# Patient Record
Sex: MALE | Race: WHITE | URBAN - METROPOLITAN AREA
[De-identification: names, ages, dates, MRNs, and addresses within clinical notes are randomized per-mention and may not be internally consistent; named-entity substitution may affect disease eponyms.]

---

## 2017-08-01 ENCOUNTER — HISTORICAL (OUTPATIENT)
Dept: ADMINISTRATIVE | Facility: HOSPITAL | Age: 82
End: 2017-08-01

## 2017-11-06 ENCOUNTER — HISTORICAL (OUTPATIENT)
Dept: ADMINISTRATIVE | Facility: HOSPITAL | Age: 82
End: 2017-11-06

## 2018-03-14 ENCOUNTER — HISTORICAL (OUTPATIENT)
Dept: ADMINISTRATIVE | Facility: HOSPITAL | Age: 83
End: 2018-03-14

## 2018-11-28 ENCOUNTER — HISTORICAL (OUTPATIENT)
Dept: ADMINISTRATIVE | Facility: HOSPITAL | Age: 83
End: 2018-11-28

## 2019-06-21 ENCOUNTER — HISTORICAL (OUTPATIENT)
Dept: ADMINISTRATIVE | Facility: HOSPITAL | Age: 84
End: 2019-06-21

## 2019-10-19 ENCOUNTER — HISTORICAL (OUTPATIENT)
Dept: ADMINISTRATIVE | Facility: HOSPITAL | Age: 84
End: 2019-10-19

## 2019-10-19 LAB
APPEARANCE, UA: CLEAR
BILIRUB UR QL STRIP: NEGATIVE
COLOR UR: YELLOW
GLUCOSE (UA): ABNORMAL
HGB UR QL STRIP: NEGATIVE
KETONES UR QL STRIP: NEGATIVE
LEUKOCYTE ESTERASE UR QL STRIP: NEGATIVE
NITRITE UR QL STRIP: NEGATIVE
PH UR STRIP: 6 [PH] (ref 5–7)
PROT UR QL STRIP: NEGATIVE
SP GR UR STRIP: 1.01 (ref 1–1.03)
UROBILINOGEN UR STRIP-ACNC: NEGATIVE

## 2019-11-14 ENCOUNTER — HISTORICAL (OUTPATIENT)
Dept: ADMINISTRATIVE | Facility: HOSPITAL | Age: 84
End: 2019-11-14

## 2019-11-14 LAB
APPEARANCE, UA: CLEAR
BACTERIA SPEC CULT: ABNORMAL
BILIRUB UR QL STRIP: NEGATIVE
COLOR UR: YELLOW
GLUCOSE (UA): ABNORMAL
HGB UR QL STRIP: ABNORMAL
HYALINE CASTS #/AREA URNS LPF: ABNORMAL /LPF
KETONES UR QL STRIP: NEGATIVE
LEUKOCYTE ESTERASE UR QL STRIP: NEGATIVE
NITRITE UR QL STRIP: NEGATIVE
PH UR STRIP: 6 [PH] (ref 5–7)
PROT UR QL STRIP: ABNORMAL
RBC #/AREA URNS HPF: ABNORMAL /HPF
SP GR UR STRIP: 1.02 (ref 1–1.03)
SQUAMOUS EPITHELIAL, UA: ABNORMAL /LPF
UROBILINOGEN UR STRIP-ACNC: NEGATIVE
WBC #/AREA URNS HPF: ABNORMAL /HPF

## 2019-11-22 ENCOUNTER — HISTORICAL (OUTPATIENT)
Dept: ADMINISTRATIVE | Facility: HOSPITAL | Age: 84
End: 2019-11-22

## 2019-11-22 LAB
ABS NEUT (OLG): 6.73 X10(3)/MCL (ref 2.1–9.2)
ALBUMIN SERPL-MCNC: 2.5 GM/DL (ref 3.4–5)
ALBUMIN/GLOB SERPL: 0.7 {RATIO}
ALP SERPL-CCNC: 75 UNIT/L (ref 50–136)
ALT SERPL-CCNC: 10 UNIT/L (ref 12–78)
APPEARANCE, UA: CLEAR
AST SERPL-CCNC: 27 UNIT/L (ref 15–37)
BACTERIA SPEC CULT: ABNORMAL /HPF
BASOPHILS # BLD AUTO: 0 X10(3)/MCL (ref 0–0.2)
BASOPHILS NFR BLD AUTO: 0 %
BILIRUB SERPL-MCNC: 0.7 MG/DL (ref 0.2–1)
BILIRUB UR QL STRIP: NEGATIVE
BILIRUBIN DIRECT+TOT PNL SERPL-MCNC: 0.2 MG/DL (ref 0–0.2)
BILIRUBIN DIRECT+TOT PNL SERPL-MCNC: 0.5 MG/DL (ref 0–0.8)
BUN SERPL-MCNC: 45 MG/DL (ref 7–18)
CALCIUM SERPL-MCNC: 8.9 MG/DL (ref 8.5–10.1)
CHLORIDE SERPL-SCNC: 105 MMOL/L (ref 98–107)
CO2 SERPL-SCNC: 26 MMOL/L (ref 21–32)
COLOR UR: YELLOW
CREAT SERPL-MCNC: 1.47 MG/DL (ref 0.7–1.3)
EOSINOPHIL # BLD AUTO: 0.1 X10(3)/MCL (ref 0–0.9)
EOSINOPHIL NFR BLD AUTO: 1 %
ERYTHROCYTE [DISTWIDTH] IN BLOOD BY AUTOMATED COUNT: 12.2 % (ref 11.5–17)
GLOBULIN SER-MCNC: 3.7 GM/DL (ref 2.4–3.5)
GLUCOSE (UA): ABNORMAL
GLUCOSE SERPL-MCNC: 287 MG/DL (ref 74–106)
HCT VFR BLD AUTO: 33.5 % (ref 42–52)
HGB BLD-MCNC: 11 GM/DL (ref 14–18)
HGB UR QL STRIP: NEGATIVE
KETONES UR QL STRIP: ABNORMAL
LEUKOCYTE ESTERASE UR QL STRIP: ABNORMAL
LYMPHOCYTES # BLD AUTO: 1 X10(3)/MCL (ref 0.6–4.6)
LYMPHOCYTES NFR BLD AUTO: 12 %
MCH RBC QN AUTO: 33 PG (ref 27–31)
MCHC RBC AUTO-ENTMCNC: 32.8 GM/DL (ref 33–36)
MCV RBC AUTO: 100.6 FL (ref 80–94)
MONOCYTES # BLD AUTO: 0.7 X10(3)/MCL (ref 0.1–1.3)
MONOCYTES NFR BLD AUTO: 8 %
NEUTROPHILS # BLD AUTO: 6.73 X10(3)/MCL (ref 2.1–9.2)
NEUTROPHILS NFR BLD AUTO: 78 %
NITRITE UR QL STRIP: NEGATIVE
PH UR STRIP: 5.5 [PH] (ref 5–9)
PLATELET # BLD AUTO: 119 X10(3)/MCL (ref 130–400)
PMV BLD AUTO: 10.5 FL (ref 9.4–12.4)
POTASSIUM SERPL-SCNC: 4.2 MMOL/L (ref 3.5–5.1)
PROT SERPL-MCNC: 6.2 GM/DL (ref 6.4–8.2)
PROT UR QL STRIP: ABNORMAL
RBC # BLD AUTO: 3.33 X10(6)/MCL (ref 4.7–6.1)
RBC #/AREA URNS HPF: ABNORMAL /[HPF]
SODIUM SERPL-SCNC: 139 MMOL/L (ref 136–145)
SP GR UR STRIP: 1.02 (ref 1–1.03)
SQUAMOUS EPITHELIAL, UA: ABNORMAL
UROBILINOGEN UR STRIP-ACNC: 0.2
WBC # SPEC AUTO: 8.6 X10(3)/MCL (ref 4.5–11.5)
WBC #/AREA URNS HPF: 18 /HPF (ref 0–3)

## 2019-11-23 LAB
ABS NEUT (OLG): 6.53 X10(3)/MCL (ref 2.1–9.2)
ABS NEUT (OLG): 6.73 X10(3)/MCL (ref 2.1–9.2)
ALBUMIN SERPL-MCNC: 2.6 GM/DL (ref 3.4–5)
ALBUMIN/GLOB SERPL: 0.7 RATIO (ref 1.1–2)
ALP SERPL-CCNC: 74 UNIT/L (ref 50–136)
ALT SERPL-CCNC: 17 UNIT/L (ref 12–78)
AST SERPL-CCNC: 19 UNIT/L (ref 15–37)
BASOPHILS # BLD AUTO: 0 X10(3)/MCL (ref 0–0.2)
BASOPHILS # BLD AUTO: 0 X10(3)/MCL (ref 0–0.2)
BASOPHILS NFR BLD AUTO: 0 %
BASOPHILS NFR BLD AUTO: 0 %
BILIRUB SERPL-MCNC: 0.8 MG/DL (ref 0.2–1)
BILIRUBIN DIRECT+TOT PNL SERPL-MCNC: 0.2 MG/DL (ref 0–0.5)
BILIRUBIN DIRECT+TOT PNL SERPL-MCNC: 0.6 MG/DL (ref 0–0.8)
BUN SERPL-MCNC: 36 MG/DL (ref 7–18)
CALCIUM SERPL-MCNC: 8.7 MG/DL (ref 8.5–10.1)
CHLORIDE SERPL-SCNC: 108 MMOL/L (ref 98–107)
CO2 SERPL-SCNC: 28 MMOL/L (ref 21–32)
CREAT SERPL-MCNC: 1.23 MG/DL (ref 0.7–1.3)
EOSINOPHIL # BLD AUTO: 0.1 X10(3)/MCL (ref 0–0.9)
EOSINOPHIL # BLD AUTO: 0.1 X10(3)/MCL (ref 0–0.9)
EOSINOPHIL NFR BLD AUTO: 1 %
EOSINOPHIL NFR BLD AUTO: 2 %
ERYTHROCYTE [DISTWIDTH] IN BLOOD BY AUTOMATED COUNT: 12.1 % (ref 11.5–17)
ERYTHROCYTE [DISTWIDTH] IN BLOOD BY AUTOMATED COUNT: 12.2 % (ref 11.5–17)
GLOBULIN SER-MCNC: 3.5 GM/DL (ref 2.4–3.5)
GLUCOSE SERPL-MCNC: 214 MG/DL (ref 74–106)
HCT VFR BLD AUTO: 35.8 % (ref 42–52)
HCT VFR BLD AUTO: 36 % (ref 42–52)
HGB BLD-MCNC: 11.8 GM/DL (ref 14–18)
HGB BLD-MCNC: 11.8 GM/DL (ref 14–18)
LYMPHOCYTES # BLD AUTO: 1 X10(3)/MCL (ref 0.6–4.6)
LYMPHOCYTES # BLD AUTO: 1 X10(3)/MCL (ref 0.6–4.6)
LYMPHOCYTES NFR BLD AUTO: 12 %
LYMPHOCYTES NFR BLD AUTO: 12 %
MCH RBC QN AUTO: 32.9 PG (ref 27–31)
MCH RBC QN AUTO: 33 PG (ref 27–31)
MCHC RBC AUTO-ENTMCNC: 32.8 GM/DL (ref 33–36)
MCHC RBC AUTO-ENTMCNC: 33 GM/DL (ref 33–36)
MCV RBC AUTO: 100 FL (ref 80–94)
MCV RBC AUTO: 100.3 FL (ref 80–94)
MONOCYTES # BLD AUTO: 0.5 X10(3)/MCL (ref 0.1–1.3)
MONOCYTES # BLD AUTO: 0.5 X10(3)/MCL (ref 0.1–1.3)
MONOCYTES NFR BLD AUTO: 6 %
MONOCYTES NFR BLD AUTO: 6 %
NEUTROPHILS # BLD AUTO: 6.53 X10(3)/MCL (ref 2.1–9.2)
NEUTROPHILS # BLD AUTO: 6.73 X10(3)/MCL (ref 2.1–9.2)
NEUTROPHILS NFR BLD AUTO: 79 %
NEUTROPHILS NFR BLD AUTO: 80 %
PLATELET # BLD AUTO: 130 X10(3)/MCL (ref 130–400)
PLATELET # BLD AUTO: 132 X10(3)/MCL (ref 130–400)
PMV BLD AUTO: 10.1 FL (ref 9.4–12.4)
PMV BLD AUTO: 10.1 FL (ref 9.4–12.4)
POTASSIUM SERPL-SCNC: 4.4 MMOL/L (ref 3.5–5.1)
PROT SERPL-MCNC: 6.1 GM/DL (ref 6.4–8.2)
RBC # BLD AUTO: 3.58 X10(6)/MCL (ref 4.7–6.1)
RBC # BLD AUTO: 3.59 X10(6)/MCL (ref 4.7–6.1)
SODIUM SERPL-SCNC: 144 MMOL/L (ref 136–145)
WBC # SPEC AUTO: 8.3 X10(3)/MCL (ref 4.5–11.5)
WBC # SPEC AUTO: 8.4 X10(3)/MCL (ref 4.5–11.5)

## 2019-11-24 LAB
ABS NEUT (OLG): 6.81 X10(3)/MCL (ref 2.1–9.2)
BASOPHILS # BLD AUTO: 0 X10(3)/MCL (ref 0–0.2)
BASOPHILS NFR BLD AUTO: 0 %
BUN SERPL-MCNC: 29 MG/DL (ref 7–18)
CALCIUM SERPL-MCNC: 8.1 MG/DL (ref 8.5–10.1)
CHLORIDE SERPL-SCNC: 108 MMOL/L (ref 98–107)
CO2 SERPL-SCNC: 28 MMOL/L (ref 21–32)
CREAT SERPL-MCNC: 1.05 MG/DL (ref 0.7–1.3)
CREAT/UREA NIT SERPL: 27.6
EOSINOPHIL # BLD AUTO: 0.2 X10(3)/MCL (ref 0–0.9)
EOSINOPHIL NFR BLD AUTO: 2 %
ERYTHROCYTE [DISTWIDTH] IN BLOOD BY AUTOMATED COUNT: 12.1 % (ref 11.5–17)
GLUCOSE SERPL-MCNC: 166 MG/DL (ref 74–106)
HCT VFR BLD AUTO: 32.3 % (ref 42–52)
HGB BLD-MCNC: 10.6 GM/DL (ref 14–18)
LYMPHOCYTES # BLD AUTO: 1.2 X10(3)/MCL (ref 0.6–4.6)
LYMPHOCYTES NFR BLD AUTO: 13 %
MCH RBC QN AUTO: 32.8 PG (ref 27–31)
MCHC RBC AUTO-ENTMCNC: 32.8 GM/DL (ref 33–36)
MCV RBC AUTO: 100 FL (ref 80–94)
MONOCYTES # BLD AUTO: 0.5 X10(3)/MCL (ref 0.1–1.3)
MONOCYTES NFR BLD AUTO: 6 %
NEUTROPHILS # BLD AUTO: 6.81 X10(3)/MCL (ref 2.1–9.2)
NEUTROPHILS NFR BLD AUTO: 77 %
PLATELET # BLD AUTO: 150 X10(3)/MCL (ref 130–400)
PMV BLD AUTO: 10 FL (ref 9.4–12.4)
POTASSIUM SERPL-SCNC: 4 MMOL/L (ref 3.5–5.1)
RBC # BLD AUTO: 3.23 X10(6)/MCL (ref 4.7–6.1)
SODIUM SERPL-SCNC: 141 MMOL/L (ref 136–145)
WBC # SPEC AUTO: 8.8 X10(3)/MCL (ref 4.5–11.5)

## 2019-11-25 LAB
ABS NEUT (OLG): 5.75 X10(3)/MCL (ref 2.1–9.2)
BASOPHILS # BLD AUTO: 0 X10(3)/MCL (ref 0–0.2)
BASOPHILS NFR BLD AUTO: 0 %
BUN SERPL-MCNC: 27 MG/DL (ref 7–18)
CALCIUM SERPL-MCNC: 7.9 MG/DL (ref 8.5–10.1)
CHLORIDE SERPL-SCNC: 111 MMOL/L (ref 98–107)
CO2 SERPL-SCNC: 26 MMOL/L (ref 21–32)
CREAT SERPL-MCNC: 1.05 MG/DL (ref 0.7–1.3)
CREAT/UREA NIT SERPL: 25.7
EOSINOPHIL # BLD AUTO: 0.2 X10(3)/MCL (ref 0–0.9)
EOSINOPHIL NFR BLD AUTO: 2 %
ERYTHROCYTE [DISTWIDTH] IN BLOOD BY AUTOMATED COUNT: 12.3 % (ref 11.5–17)
GLUCOSE SERPL-MCNC: 181 MG/DL (ref 74–106)
HCT VFR BLD AUTO: 29.9 % (ref 42–52)
HGB BLD-MCNC: 9.7 GM/DL (ref 14–18)
LYMPHOCYTES # BLD AUTO: 1 X10(3)/MCL (ref 0.6–4.6)
LYMPHOCYTES NFR BLD AUTO: 13 %
MCH RBC QN AUTO: 32.7 PG (ref 27–31)
MCHC RBC AUTO-ENTMCNC: 32.4 GM/DL (ref 33–36)
MCV RBC AUTO: 100.7 FL (ref 80–94)
MONOCYTES # BLD AUTO: 0.4 X10(3)/MCL (ref 0.1–1.3)
MONOCYTES NFR BLD AUTO: 6 %
NEUTROPHILS # BLD AUTO: 5.75 X10(3)/MCL (ref 2.1–9.2)
NEUTROPHILS NFR BLD AUTO: 77 %
PLATELET # BLD AUTO: 152 X10(3)/MCL (ref 130–400)
PMV BLD AUTO: 10 FL (ref 9.4–12.4)
POTASSIUM SERPL-SCNC: 4.2 MMOL/L (ref 3.5–5.1)
RBC # BLD AUTO: 2.97 X10(6)/MCL (ref 4.7–6.1)
SODIUM SERPL-SCNC: 143 MMOL/L (ref 136–145)
WBC # SPEC AUTO: 7.5 X10(3)/MCL (ref 4.5–11.5)

## 2022-09-13 NOTE — ED PROVIDER NOTES
Patient:   Max Oemr            MRN: [id number]            FIN: [id number]               Age:   85 years     Sex:  Male     :  10/21/1933   Associated Diagnoses:   Low back strain   Author:   Andres BELL MD, Danny URIOSTEGUI      Basic Information   Time seen: Date & time 2019 11:34:00.   History source: Patient, EMS.   Arrival mode: Ambulance.   History limitation: None.   Additional information: Chief Complaint from Nursing Triage Note : Chief Complaint   2019 11:35 CDT      Chief Complaint           level 2 trauma, fall  .      History of Present Illness   The patient presents following 84 y/o CM with hx of DM, CVA and Parkinsons presents to the ED via EMS as a level 2 trauma sent from [facility] being pt was attempting to get out of bed when he fell and hit his posterior head PTA. EMS reports there was blood on the floor upon their arrival. Pt's GCS was initially 15, then dropped to 14 d/t confusion as pt was unable to tell them who the president was. .  The onset was just prior to arrival.  The occurrence was single episode.  The fall was described as tripped.  The location where the incident occurred was at a nursing home ([facility]).  Location: Posterior head. The character of symptoms is pain.  The degree at present is moderate.  The exacerbating factor is none.  The relieving factor is none.  Risk factors consist of age and CVA, DM.  Therapy today: emergency medical services.  Preceding symptoms none.        Review of Systems   Constitutional symptoms:  No fever, no chills, no sweats, no weakness.    Skin symptoms:  No rash,    Eye symptoms:  No recent vision problems,    ENMT symptoms:  No ear pain,    Respiratory symptoms:  No shortness of breath, no orthopnea.    Cardiovascular symptoms:  No chest pain, no palpitations.    Gastrointestinal symptoms:  No abdominal pain, no nausea, no vomiting, no diarrhea.    Genitourinary symptoms:  No dysuria, no hematuria.     Musculoskeletal symptoms:  No back pain, no Muscle pain.    Psychiatric symptoms:  No anxiety, no depression.    Allergy/immunologic symptoms:  No seasonal allergies, no food allergies.              Additional review of systems information: All other systems reviewed and otherwise negative, All systems reviewed as documented in chart.      Health Status   Allergies: No known allergies.   Medications:  (Selected)   Inpatient Medications  Ordered  Sodium Chloride 0.9% intravenous solution 1,000 mL: 1,000 mL, 1,000 mL, IV, Bolus, start date 06/21/19 11:41:00 CDT  Prescriptions  Prescribed  Sinemet CR 50 mg-200 mg oral tablet, extended release: 2 tab(s), Oral, QID, # 90 tab(s), 3 Refill(s), Pharmacy: [facility]  albuterol CFC free 90 mcg/inh inhalation aerosol with adapter: 2 puff(s), INH, q4hr, PRN PRN for wheezing, # 1 EA, 0 Refill(s)  sertraline 50 mg oral tablet: 50 mg = 1 tab(s), Oral, Daily, # 30 tab(s), 11 Refill(s), other reason (Rx)  Documented Medications  Documented  ADVAIR HFA   /21: 2 puff(s), INH, BID  ALBUTEROL SULFATE .083 % NEBU: 2.5 mg = 3 mL, NEB, q8hr  Bacmin oral tablet: 1 tab(s), Oral, Daily, # 90 tab(s), 0 Refill(s)  Flovent  mcg/inh inhalation aerosol: 2 puff(s), INH, BID, 0 Refill(s)  JANUVIA 100 MG TABS: 100 mg = 1 tab(s), Oral, Daily  Lantus 100 units/mL subcutaneous inj.: 5 units, Subcutaneous, Once a day (at bedtime), # 10 mL, 0 Refill(s)  SERTRALINE HCL 50 MG TABS: 50 mg = 1 tab(s), Oral, Daily  Tylenol Extra Strength 500 mg oral tablet: 500 mg = 1 tab(s), Oral, q4hr, PRN PRN as needed for pain, # 24 tab(s), 0 Refill(s)  aspirin 81 mg oral tablet: 81 mg = 1 tab(s), Oral, Daily, # 30 tab(s), 0 Refill(s)  carvedilol 6.25 mg oral tablet: 6.25 mg = 1 tab(s), Oral, BID, # 60 tab(s), 0 Refill(s)  glimepiride 4 mg oral tablet: 4 mg = 1 tab(s), Oral, BID, # 90 tab(s), 0 Refill(s)  isosorbide DInitrate 20 mg oral tablet: 20 mg = 1 tab(s), Oral, BID, # 120 tab(s), 0  Refill(s)  lisinopril 10 mg oral tablet: 10 mg = 1 tab(s), Oral, Daily, # 90 tab(s), 0 Refill(s)  metFORMIN 1000 mg oral tablet: 1000 mg = 1 tab(s), Oral, BID  metformin 500 mg oral tablet: 500 mg = 1 tab(s), Oral, BID, # 180 tab(s), 0 Refill(s)  pravastatin 40 mg oral tablet: 40 mg = 1 tab(s), Oral, Daily, # 90 tab(s), 0 Refill(s)  tamsulosin 0.4 mg oral capsule: 0.4 mg = 1 cap(s), Oral, Daily, # 90 cap(s), 0 Refill(s).      Past Medical/ Family/ Social History   Medical history:    Resolved  Prostate cancer ([id number]):  Resolved.  Parkinson disease ([id number]):  Resolved..   Surgical history:    Back fusion ([id number]) in 1988 at 55 Years.  Cataract surgery ([id number]).  Heart valve repair ([id number])..   Family history:    Heart failure.  Mother  Lung cancer.  Father  Brother  Sister  .   Social history: Alcohol use: Denies, Tobacco use: Denies, Drug use: Denies, Occupation: Retired, Family/social situation: , nursing home resident ([facility]).      Physical Examination               Vital Signs      No qualifying data available.               Per nurse's notes.   Oxygen saturation.   General:  Alert, no acute distress, elderly male .    Panther coma scale:  Eye response: 4 /4, verbal response: 5 /5, motor response: 6 /6, Total score: Total score: 15.    Neurological:  Alert and oriented to person, place, time, and situation, No focal neurological deficit observed, CN II-XII intact, normal sensory observed, normal motor observed, normal speech observed.    Skin:  Warm, pink, intact, moist, no rash.    Head:  Normocephalic, left occiput hematoma with laceration and surrounding erythema, unable to completely visualize d/t cervical collar.    Neck:  Cervical collar in place by EMS. Replaced collar at 1139..   Cardiovascular:  Regular rate and rhythm, No murmur, Normal peripheral perfusion, No edema.    Respiratory:  Lungs are clear to auscultation,  respirations are non-labored, breath sounds are equal, Symmetrical chest wall expansion.    Gastrointestinal:  Soft, Non distended, Normal bowel sounds.    Musculoskeletal:  Normal ROM, normal strength.    Lymphatics:  No lymphadenopathy.   Psychiatric:  Cooperative, appropriate mood & affect, normal judgment.       Medical Decision Making   Documents reviewed:  Emergency department nurses' notes.   Orders  Laboratory    POC ISTAT Chem8 Request:, Danny Campos III, MD, 06/21/19, 11:41, Completed    CBC w/ Auto Diff, Danny Campos III, MD, 06/21/19, 11:41, Completed    EtOH Level, Danny Campos III, MD, 06/21/19, 11:41, Completed    Automated Diff, Danny Campos III, MD, 06/21/19, 11:43, Completed    Point of Care iSTAT Chem8, ER, Physician, 06/21/19, 12:06, Completed    CMP, Danny Campos III, MD, 06/21/19, 11:41, Ordered    Urinalysis Complete a reflex to culture, Danny Campos III, MD, 06/21/19, 11:41, Ordered    Urine Drug Screen, Danny Campos III, MD, 06/21/19, 11:41, Ordered    Amylase Level, Danny Campos III, MD, 06/21/19, 11:41, Ordered    Lipase Level, Danny Campos III, MD, 06/21/19, 11:41, Ordered    Lactic Acid, Danny Campos III, MD, 06/21/19, 11:41, Ordered    PT, Danny Campos III, MD, 06/21/19, 11:41, Ordered    PTT, Danny Campos III, MD, 06/21/19, 11:41, Ordered    Type and Crossmatch 1st order, Danny Campos III, MD, 06/21/19, 11:41, Ordered    Type and Rh, Danny Campos III, MD, 06/21/19, 11:41, Ordered    Antibody Screen for transfusion  (Indirect Judy), Danny Campos III, MD, 06/21/19, 11:41, Ordered    Xmatch, Danny Campos III, MD, 06/21/19, 11:41, Ordered    Red Blood Cells, Danny Campos III, MD, 06/21/19, 11:41, Ordered  Xray    XR Chest 1 View, Danny Campos III, MD, 06/21/19, 11:41, Ordered    XR Pelvis 1 or 2 Views, Danny Campos III, MD, 06/21/19, 11:41, Ordered  EKG / Respiratory / Ancillary    O2 Therapy,  ER, Physician, 06/21/19, 12:07, Ordered  CT / MRI / Ultrasound    CT Head W/O Contrast, Danny Campos III, MD, 06/21/19, 11:41, Ordered    CT Cervical Spine W/O Contrast, Danny Campos III, MD, 06/21/19, 11:41, Ordered.   Results review:  Lab results : Lab View   6/21/2019 11:53 CDT      POC Sodium                137 mmol/L  LOW                             POC Potassium             4.7 mmol/L                             POC Chloride              103 mmol/L                             POC Ion Calcium           1.18 mmol/L                             POC Glucose               146 mg/dL  HI                             POC BUN                   32.0 mg/dL  HI                             POC Creatinine            1.0 mg/dL                             POC AGAP                  17.0  NA                             POC Hb                    12.6 mg/dL                             POC Hct                   37.0 %  LOW                             POC TCO2                  22.0 mmol/L  LOW    6/21/2019 11:43 CDT      Sodium Lvl                138 mmol/L                             Potassium Lvl             4.8 mmol/L                             Chloride                  108 mmol/L  HI                             CO2                       28.0 mmol/L                             Calcium Lvl               9.0 mg/dL                             Glucose Lvl               151 mg/dL  HI                             BUN                       34.0 mg/dL  HI                             Amylase Lvl               62 unit/L                             Albumin Lvl               3.70 gm/dL                             Lipase Lvl                113 unit/L                             WBC                       8.1 x10(3)/mcL                             RBC                       3.63 x10(6)/mcL  LOW                             Hgb                       12.1 gm/dL  LOW                             Hct                       35.5 %  LOW                              Platelet                  140 x10(3)/mcL                             MCV                       97.8 fL  HI                             MCH                       33.3 pg  HI                             MCHC                      34.1 gm/dL                             RDW                       12.6 %                             MPV                       9.8 fL                             Abs Neut                  6.37 x10(3)/mcL                             Neutro Auto               79 %  NA                             Lymph Auto                11 %  NA                             Mono Auto                 7 %  NA                             Eos Auto                  2 %  NA                             Abs Eos                   0.1 x10(3)/mcL                             Basophil Auto             0 %  NA                             Abs Neutro                6.37 x10(3)/mcL                             Abs Lymph                 0.9 x10(3)/mcL                             Abs Mono                  0.6 x10(3)/mcL                             Abs Baso                  0.0 x10(3)/mcL                             Ethanol Lvl               <3.0 mg/dL  .      Reexamination/ Reevaluation   Time: 6/21/2019 14:55:00 .   Interventions: Aspirin only maintain normal neurologic exam laceration) no persistent low back pain x-ray does not reveal any acute abnormalities compared to CT done in November but will repeat CT back for acute injury.      Procedure   Laceration repair   Time: 6/21/2019 14:56:00 .    Confirmed: Patient, procedure, side, and site correct.    Consent: Patient.       Description/ repair   Laceration  2 cm in length.Shape: linear.   Depth: superficial.   Details: clean.   Neurovascular/ tendon exam: intact.   Anesthesia: 1% lidocaine.   Preparation: sterile field established.   Irrigation: minimal.   Debridement: none.   Skin closure: # 2 sutures, simple technique, interrupted technique.   Complexity:  single layer.   Post procedure exam: Circulation, motor, sensory examination intact.    Complications: None.    Patient tolerated: Well.    Performed by: Self.    Total time: 5 minutes.       Impression and Plan   Diagnosis   Low back strain (JXH60-AD S39.012A)   Plan   Condition: Improved, Stable.    Disposition: Medically cleared.    Follow up with: Primary Care Physician.    Counseled: Patient, Regarding diagnosis, Regarding diagnostic results, Regarding treatment plan, Regarding prescription, Patient understood , Patient indicated understanding of instructions.    Notes: Ping LE, acted solely as a scribe for and in the presence of Dr. Campos who performed the service..

## 2022-09-13 NOTE — DISCHARGE SUMMARY
Patient:   Max Omer             MRN: [id number]            FIN: [id number]               Age:   84 years     Sex:  Male     :  10/21/1933   Associated Diagnoses:   None   Author:   Karl Haile MD      Discharge Information      Discharge Summary Information   ADMIT/DISCHARGE DATE   Admit Date: 2017  Discharge Date: 2017     Physicians   Attending Physician - Pako BEDOLLA, Tino DIANA  Attending Physician - Mic BEDOLLA, Karl    Consulting Physician - Chaz BEDOLLA, Christian BARBOSA    Primary Care Physician - Isabel BEDOLLA , Arnie Chappell     Discharge Diagnosis   L1 and L2 Compression Fracture s/p Fall    Chronic appearing T12 and L4 fractures per CT report      BLE weakness/Falls    Parkinsons Disease     HTN, benign    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   Immunizations   No immunizations recorded for this visit.     Discharge Medications   Continue  albuterol (albuterol CFC free 90 mcg/inh inhalation aerosol with adapter) 2 puff(s), INH, q4hr, PRN for wheezing  aspirin (aspirin 81 mg oral tablet) 81 mg, Oral, Daily  carbidopa-levodopa (Sinemet CR 50 mg-200 mg oral tablet, extended release) 2 tab(s), Oral, QID  carvedilol (carvedilol 6.25 mg oral tablet) 6.25 mg, Oral, BID  glimepiride (glimepiride 4 mg oral tablet) 4 mg, Oral, BID  isosorbide dinitrate (isosorbide DInitrate 20 mg oral tablet) 20 mg, Oral, BID  lisinopril (lisinopril 10 mg oral tablet) 10 mg, Oral, Daily  metFORMIN (metformin 500 mg oral tablet) 500 mg, Oral, BID  multivitamin with minerals (Bacmin oral tablet) 1 tab(s), Oral, Daily  pravastatin (pravastatin 40 mg oral tablet) 40 mg, Oral, Daily  sertraline (Zoloft 100 mg oral tablet) 100 mg, Oral, Daily  sitaGLIPtin (JANUVIA 100 MG TABS) 100 mg, Oral, Daily  tamsulosin (tamsulosin 0.4 mg oral capsule) 0.4  "mg, Oral, Daily  Discontinue  Misc Prescription (Rolling walker with seat) See Instructions  Misc Prescription (Seated bike pedal device) See Instructions  cefdinir (CEFDINIR 300 MG CAPS) 300 mg, Oral, BID  furosemide (Lasix 40 mg oral tablet) 40 mg, Oral, BID  potassium chloride (potassium chloride 20 mEq oral TABLET extended release) 20 mEq, Oral, Daily        Education   Discharge - Home, Give all scheduled vaccinations prior to discharge.   Discharge Activity - Activity as Tolerated   Discharge Diet - Cardiac         Followup   ATTENTION: Discharging Nurse:  If patient is discharged home, we need to notify his  agency: [facility] @ [phone] & alert them that their patient is being discharged home.  F/U with PCP when discharged from rehab  Ced Hubbard MD   Call for followup appointment in 2-3 weeks with 3 view LS spine xrays just prior to appt        Hospital Course   Hospital Course    85 yo CM with pmhx of  Parkinsons Disease, HTN, VHD s/p porcine valve, CAD, DMII, CVA, HLD and Prostate Cancer (remote) who presents to the ED with c/o increasing  weakness and  falls for the past week. Pt fell early last week, was seen by Dr. Ware on Friday (11/3) and Sinimet was increased.  Pt fell again on Saturday (11/4) and Sunday (11/5). Pt states "I fall because my legs shake and get weak". Pt fell onto his bed on Saturday while trying to get dressed. there was no LOC or head injury. The second fall was while he was walking, pt states he fell onto the side of the door. He did hit his head on the door but there was no LOC.  Since then pt has been experiencing some lower back pain so he was brought in to the ED for further evaluation. Pt denies unilateral weakness, facial droop, near syncope, dizziness, palpitations, CP, abd  pain, nausea, vomiting, diarrhea, urinary complaints, or bowel/bladder incontinence. Pt is able to ambulate but is very difficult per family. At baseline, he ambulates with walker and in able " to preform most ADLs independently. He is on lasix daily, he denies hx of CHF, states my legs swell when i sit down so I take a fluid pill.         Initial ED VS: Temp 36.5, HR 90, R 18, /74, 02 97% RA. Labs remarkable for BUN/ Crt 48/2.01, glucose 237.  CT Head negative for acute intracranial process, incidental findings of carotid siphon and vertebrobasilar atherosclerotic vascular calcifications.   CT Lumbar Spine reported L1 and L2 superior endplate fractures that have an acute appearance, no retropulsion and T12 and L4 superior endplate fractures but these have a chronic appearance. EKG: NSR with LBBB (unchanged from previous EKG In August 2017).     Pt is admitted to the Hospitalist Service with consult to Neurosurgery in AM. NSY evaluated and recommended no surgery and just brace placement. Pt on admit had mild ARF. His lasix was stopped. Placed on IV hydration. Rpt labs showed improvement in his renal functions. OT/PT team was consulted. Pt has been ambulating with PTx. Currently stable and asymptomatic. He was denied by rehab. Accepted to SNF today.   Condition stable  Diet: cardiac  Meds: per dc med rec  F/U with PCP when discharged from SNF  For further questions contact hospitalist office  DC 31 mts    .        Physical Examination   General:  Alert and oriented, No acute distress.    Respiratory:  Lungs are clear to auscultation, Breath sounds are equal.    Cardiovascular:  Normal rate, Regular rhythm, No murmur.    Gastrointestinal:  Soft, Non-tender, Non-distended, Normal bowel sounds.    Neurologic:  Alert, Oriented, No focal deficits, Cranial Nerves II-XII are grossly intact.    Psychiatric:  Cooperative.       Discharge Plan   Education and Follow-up   Counseled: patient, regarding diagnosis, regarding treatment, regarding medications.

## 2022-09-13 NOTE — ED PROVIDER NOTES
Patient:   Max Omer             MRN: [id number]            FIN: [id number]               Age:   84 years     Sex:  Male     :  10/21/1933   Associated Diagnoses:   Thoracic compression fracture; Weakness or fatigue; Weakness generalized; Frequent falls; Parkinsonism   Author:   Sydni BEDOLLA, Kalia HENNESSY      Basic Information   Time seen: Date & time 2017 13:39:00.   History source: Patient, family.   Arrival mode: Ambulance.   History limitation: None.   Additional information: Patient's physician(s): Isabel BEDOLLA , Arnie Chappell, Chief Complaint from Nursing Triage Note : Chief Complaint   2017 13:17 CST      Chief Complaint           pt to ER via [facility] for weakness.  family states increased falls.  pt only complains of chronic back pain.  pt CBG with .  , I have assummed care of this patient at     1505      . DWMMD.      History of Present Illness   The patient presents with weakness, Patient states weakness and increased falls. patient c/o chronic back pain (marilyn, NP).  and 84 year old male with history of HTN, DM, and Parkinson's disease presents to the ED via EMS complaining of increasing weakness and falls. Family reports patient fell 2 times 1 day ago. The first fall was into the bed while trying to get dressed, and the second fall was onto a door. Patient denies any pain. Family reports that patient has been having increasing trouble walking for 1 week. They report decreased urine output and decreased appetite for 1 day. Family reports that patient has also been complaining of back pain since falling 1 week ago. Family denies noticing any changes to one side of the body. .  The onset was increasing for 1 week.  The course/duration of symptoms is worsening.  The character of symptoms is generalized.  The degree at present is moderate.  Risk factors consist of hypertension, diabetes mellitus and Parkinson's Disease.  Prior episodes: occasional.  Therapy today: EMS.  Associated  symptoms: back pain.  No unilateral symptoms right or left.  No facial asymmetry hard time walking however daughter says he cannot walk for the past 3 or 4 days.        Review of Systems   Constitutional symptoms:  Weakness, increasing trouble walking.    Skin symptoms:  Negative except as documented in HPI.   Eye symptoms:  Negative except as documented in HPI.   ENMT symptoms:  Negative except as documented in HPI.   Respiratory symptoms:  Negative except as documented in HPI.   Cardiovascular symptoms:  Negative except as documented in HPI.   Gastrointestinal symptoms:  Negative except as documented in HPI.   Genitourinary symptoms:  Negative except as documented in HPI.   Musculoskeletal symptoms:  Back pain due to fall 1 week ago.   Neurologic symptoms:  Negative except as documented in HPI.   Psychiatric symptoms:  Negative except as documented in HPI.   Endocrine symptoms:  Negative except as documented in HPI.   Hematologic/Lymphatic symptoms:  Negative except as documented in HPI.   Allergy/immunologic symptoms:  Negative except as documented in HPI.             Additional review of systems information: All other systems reviewed and otherwise negative.      Health Status   Allergies:    Allergic Reactions (Selected)  No Known Medication Allergies.   Medications:  (Selected)   Inpatient Medications  Ordered  Sodium Chloride 0.9% 1000mL 1,000 mL: 1,000 mL, 1,000 mL, IV, 250 mL/hr, start date 11/06/17 15:09:00 CST  Prescriptions  Prescribed  Rolling walker with seat: Rolling walker with seat, See Instructions, Use as needed, # 1 units, 0 Refill(s)  Seated bike pedal device: Seated bike pedal device, See Instructions, Use as needed, # 1 units, 0 Refill(s)  Sinemet CR 50 mg-200 mg oral tablet, extended release: 2 tab(s), Oral, TID, # 90 tab(s), 3 Refill(s), Pharmacy: [facility]  Zoloft 100 mg oral tablet: 100 mg = 1 tab(s), Oral, Daily, # 30 tab(s), 5 Refill(s), Pharmacy: [facility]  albuterol CFC  free 90 mcg/inh inhalation aerosol with adapter: 2 puff(s), INH, q4hr, PRN PRN for wheezing, # 1 EA, 0 Refill(s)  Documented Medications  Documented  Bacmin oral tablet: 1 tab(s), Oral, Daily, # 90 tab(s), 0 Refill(s)  CEFDINIR 300 MG CAPS: 300 mg = 1 cap(s), Oral, BID  JANUVIA 100 MG TABS: 100 mg = 1 tab(s), Oral, Daily  Lasix 40 mg oral tablet: 40 mg = 1 tab(s), Oral, BID, # 90 tab(s), 0 Refill(s)  aspirin 81 mg oral tablet: 81 mg = 1 tab(s), Oral, Daily, # 30 tab(s), 0 Refill(s)  carvedilol 6.25 mg oral tablet: 6.25 mg = 1 tab(s), Oral, BID, # 60 tab(s), 0 Refill(s)  glimepiride 4 mg oral tablet: 4 mg = 1 tab(s), Oral, BID, # 90 tab(s), 0 Refill(s)  isosorbide DInitrate 20 mg oral tablet: 20 mg = 1 tab(s), Oral, BID, # 120 tab(s), 0 Refill(s)  lisinopril 10 mg oral tablet: 10 mg = 1 tab(s), Oral, Daily, # 90 tab(s), 0 Refill(s)  metformin 500 mg oral tablet: 500 mg = 1 tab(s), Oral, BID, # 180 tab(s), 0 Refill(s)  potassium chloride 20 mEq oral TABLET extended release: 20 mEq = 1 tab(s), Oral, Daily, # 30 tab(s), 0 Refill(s)  pravastatin 40 mg oral tablet: 40 mg = 1 tab(s), Oral, Daily, # 90 tab(s), 0 Refill(s)  tamsulosin 0.4 mg oral capsule: 0.4 mg = 1 cap(s), Oral, Daily, # 90 cap(s), 0 Refill(s).   Immunizations: Tetanus up to date, influenza, pneumococcal.      Past Medical/ Family/ Social History   Medical history:    Resolved  Prostate cancer ([id number]):  Resolved.  Parkinson disease ([id number]):  Resolved.,     HTN  DM.   Surgical history:    Back fusion ([id number]) in 1988 at 55 Years.  Cataract surgery ([id number]).  Heart valve repair ([id number])..   Family history:    Heart failure.  Mother  Lung cancer.  Father  Brother  Sister  .   Social history: Alcohol use: Denies, Tobacco use: Quit 40 years ago, Drug use: Denies, Occupation: Retired, Family/social situation: , intact family, lives with wife .      Physical Examination                Vital Signs   Vital Signs   11/6/2017 13:17 CST      Temperature Temporal Artery               36.5 DegC                             Peripheral Pulse Rate     90 bpm                             Respiratory Rate          18 br/min                             SpO2                      97 %                             Oxygen Therapy            Room air                             Systolic Blood Pressure   125 mmHg                             Diastolic Blood Pressure  74 mmHg  .   Measurements   11/6/2017 13:17 CST      Weight Estimated          74.5 kg                             Height/Length Estimated   157 cm                             Body Mass Index Estimated 30.22 kg/m2  .   Basic Oxygen Information   11/6/2017 13:17 CST      SpO2                      97 %                             Oxygen Therapy            Room air  .   General:  Alert, no acute distress, elderly white male , not anxious, not ill-appearing.    Skin:  Warm, dry, no pallor, no rash, normal for ethnicity, bruise to right inner thigh.    Head:  Normocephalic, atraumatic.    Neck:  Supple, trachea midline, no tenderness, no carotid bruit.    Eye:  Pupils are equal, round and reactive to light, extraocular movements are intact, normal conjunctiva.    Ears, nose, mouth and throat:  Tympanic membranes clear, oral mucosa moist, no pharyngeal erythema or exudate.    Cardiovascular:  Regular rate and rhythm, No murmur, Normal peripheral perfusion, No edema.    Respiratory:  Lungs are clear to auscultation, respirations are non-labored, breath sounds are equal.    Chest wall:  No tenderness, No deformity.    Back:  Nontender, Normal range of motion, Normal alignment, no step-offs.    Musculoskeletal:  Normal ROM, normal strength, no tenderness, no swelling, no deformity.    Gastrointestinal:  Soft, Nontender, Non distended, Normal bowel sounds, No organomegaly.    Genitourinary:  no CVA tenderness.   Neurological:  Alert and oriented to person, place,  time, and situation, No focal neurological deficit observed, CN II-XII intact, normal sensory observed, normal motor observed, normal speech observed, normal coordination observed, normal and symmetrical reflexes, No focal or unilateral findings noted., Parkinson's tremor noted.    Lymphatics:  No lymphadenopathy.   Psychiatric:  Cooperative, appropriate mood & affect, normal judgment, non-suicidal.       Medical Decision Making   Differential Diagnosis:  Weakness, deconditioning, Parkinsonism, compression fractures or fractures of back.    Documents reviewed:  Emergency department nurses' notes.   Orders  Launch Order Profile (Selected)   Inpatient Orders  Ordered  Bladder Scan: 11/06/17 16:11:00 CST, Stop date 11/06/17 16:11:00 CST, 11/06/17 16:11:00 CST  Discharge Planning Ongoing Assessment: 11/09/17 9:00:00 CST, q3day  Fall Risk Protocol: 11/06/17 14:54:15 CST, Constant Order  Orthostatic Vital Signs: 11/06/17 15:09:00 CST, Constant order  Sodium Chloride 0.9% 1000mL 1,000 mL: 1,000 mL, 1,000 mL, IV, 250 mL/hr, start date 11/06/17 15:09:00 CST  Ordered (Collected)  POC iSTAT ER Troponin request: Blood, Stat collect, Collected, 11/06/17 13:38:00 CST by Jocelyn Becker NP, Stop date 11/06/17 13:38:00 CST, Nurse collect, Print Label By Order Location  Ordered (Exam Ordered)  CT Brain Cranium W/O Contrast: Stat, 11/06/17 16:11:00 CST, Syncope, Fall Risk, Stretcher, falls, Rad Type, Schedule this test, [facility], 11/06/17 16:11:00 CST  CT Lumbar Spine W/O Contrast: Stat, 11/06/17 16:11:00 CST, Fractured lumbar vertebrae, None, Stretcher, Rad Type, Schedule this test, [facility], 11/06/17 16:11:00 CST  Completed  Automated Diff: Now collect, 11/06/17 14:04:00 CST, Blood, Collected, Stop date 11/06/17 14:04:00 CST, Lab Collect, Print Label By Order Location, 11/06/17 13:38:00 CST  CBC w/ Auto Diff: Now collect, 11/06/17 13:38:00 CST, Blood, Stop date 11/06/17 13:38:00 CST, Lab Collect, Print Label By  Order Location, 17 13:38:00 CST  CMP: Stat collect, 17 13:38:00 CST, Blood, Stop date 17 13:38:00 CST, Lab Collect, Print Label By Order Location, 17 13:38:00 CST  EK17 13:38:00 CST, 17 13:38:00 CST, NOW, -1, -1, 17 13:38:00 CST  Estimated Glomerular Filtration Rate: Stat collect, 17 14:04:00 CST, Blood, Collected, Stop date 17 14:04:00 CST, Lab Collect, Print Label By Order Location, 17 13:38:00 CST  POC Istat ER Troponin: Blood, Stat collect, Collected, 17 14:59:10 CST  UA Total a reflex to culture: Stat collect, Urine, 17 13:38:00 CST, Stop date 17 13:38:00 CST, Nurse collect, Print Label By Order Location  XR Chest 2 Views: Stat, 17 13:38:00 CST, Chest Pain, None, Stretcher, Rad Type, 17 13:38:00 CST.   Electrocardiogram:  Time 2017 15:25:00, rate 83, normal sinus rhythm, No ST-T changes, no ectopy, EP Interp, His rhythm with a left bundle branch block there is an old EKG dated 2017 heart rate was 79 at this time up  4 beats now bundle-branch block appears to be already present no acute ST or T-wave changes appreciated.    Results review:  Lab results : Lab View   2017 14:59 CST      POC Troponin              0.01 ng/mL    2017 14:32 CST      UA Appear                 CLEAR                             UA Color                  YELLOW                             UA Spec Grav              1.012                             UA Bili                   Negative                             UA pH                     5.5                             UA Urobilinogen           0.2                             UA Blood                  Negative                             UA Glucose                Negative                             UA Ketones                Negative                             UA Protein                Negative                             UA Nitrite                Negative                              UA Leuk Est               Negative                             UA WBC                    NONE SEEN /HPF                             UA RBC                    NONE SEEN                             UA Bacteria               NONE SEEN /HPF                             UA Squam Epithelial       NONE SEEN    11/6/2017 14:04 CST      Sodium Lvl                136 mmol/L                             Potassium Lvl             4.7 mmol/L                             Chloride                  101 mmol/L                             CO2                       24.0 mmol/L                             Calcium Lvl               8.9 mg/dL                             Glucose Lvl               237 mg/dL  HI                             BUN                       48.0 mg/dL  HI                             Creatinine                2.01 mg/dL  HI                             eGFR-AA                   41 mL/min/1.73 m2  NA                             eGFR-ASIA                  34 mL/min/1.73 m2  NA                             Bili Total                0.4 mg/dL                             Bili Direct               0.10 mg/dL                             Bili Indirect             0.30 mg/dL                             AST                       14 unit/L  LOW                             ALT                       10 unit/L  LOW                             Alk Phos                  192 unit/L  HI                             Total Protein             7.6 gm/dL                             Albumin Lvl               3.90 gm/dL                             Globulin                  3.70 gm/dL  HI                             A/G Ratio                 1.1 ratio                             WBC                       7.0 x10(3)/mcL                             RBC                       4.07 x10(6)/mcL  LOW                             Hgb                       12.9 gm/dL  LOW                             Hct                       38.3 %  LOW                              Platelet                  167 x10(3)/mcL                             MCV                       94.1 fL  HI                             MCH                       31.7 pg  HI                             MCHC                      33.7 gm/dL                             RDW                       13.0 %                             MPV                       10.0 fL                             Abs Neut                  5.08 x10(3)/mcL                             Neutro Auto               73 %  NA                             Lymph Auto                16 %  NA                             Mono Auto                 8 %  NA                             Eos Auto                  2 %  NA                             Abs Eos                   0.2 x10(3)/mcL                             Basophil Auto             0 %  NA                             Abs Neutro                5.08 x10(3)/mcL                             Abs Lymph                 1.1 x10(3)/mcL                             Abs Mono                  0.6 x10(3)/mcL                             Abs Baso                  0.0 x10(3)/mcL  .   Chest X-Ray:  on For Exam  Chest Pain    Radiology Report  Clinical History  Chest Pain     Technique  2 views of the chest.     Comparison  August 1, 2017     Findings  Lungs are clear with no visualized focal airspace opacity.  The trachea appears midline.  Postsurgical changes of median sternotomy. Cardiomediastinal  silhouettes within normal limits.  There is no evidence of pneumothorax or pleural effusion.  Visualized abdomen, soft tissues, and osseous structures are  unremarkable.     Impression  No acute cardiopulmonary process.       Signature Line  Electronically Signed By: Daniel Hillman MD  Date/Time Signed: 11/06/2017 13:56      This document has an image    Result type: XR Chest 2 Views  .   Head Computed Tomography:  Time reported 11/6/2017 17:08:00,            * Final Report *    Reason For  Exam  Syncope    Radiology Report  History: Syncope  Comparison studies:  None     Technique:   Axial scans were obtained from skull base to the vertex.  Coronal and sagittal reconstructions obtained from the axial data.   Automatic exposure control was utilized to limit radiation dose.  Contrast: None     Radiation Dose:  Total DLP: 1204 mGy*cm     DISCUSSION:     Scalp/Skull:  Right frontal scalp contusion.  No fractures.     Brain sulci: Appropriate for patient's age.  Ventricles: Normal in size and configuration. No hydrocephalus.     Extra-axial spaces:  No masses or fluid collections.     Parenchyma:   Right superior cerebellar cortical based and septal malacia compatible  with remote insult.  Old right basal ganglia lacunar insult.  Discrete and confluent supratentorial white matter hypodensities are  moderate nonspecific chronic microangiopathic changes, likely chronic  microvascular ischemia.  No mass, hemorrhage or CT evidence of an acute vascular insult.     Dural sinuses: No abnormal densities.  Sellar/Suprasellar region: No abnormalities.  Skull base and Craniocervical junction: No abnormalities.     Incidental findings:   Carotid siphon and vertebrobasilar atherosclerotic vascular  calcifications.  Status post bilateral cataract surgery.     IMPRESSION:     No acute intracranial abnormalities.           Signature Line  Electronically Signed By: Marcial Elam MD  Date/Time Signed: 11/06/2017 17:08  .    Radiology results:  Reported at  11/6/2017 17:16:00, Computed tomography, Lumbar Spine,            * Final Report *    Reason For Exam  Fractured lumbar vertebrae    Radiology Report  History: Fractured lumbar vertebrae  Comparison studies:  Chest CT 8/1/2017.  Abdominopelvic CT 12/26/2011.     Technique:  Axial scans were obtained through the lumbar spine.  Coronal and sagittal reconstructions obtained from the axial data.   Automatic exposure control was utilized to limit radiation  dose.  Contrast: None     Radiation Dose:  Total DLP: 1198 mGy*cm     DISCUSSION:     Transitional anatomy with a right L5-S1 pseudoarthrosis.     Alignment: Normal lordosis. No scoliosis.  Soft tissues: Fatty atrophy of the posterior paraspinal musculature.  Sacroiliac joints: Mild-moderate degenerative changes bilaterally.     Vertebrae:  Acute appearing L1 superior endplate fractures associated with 40%  loss of height anteriorly and no retropulsion.  Acute appearing L2 superior endplate fracture is not associated with  significant loss of height. No retropulsion.  Chronic appearing T12 superior endplate fractures.  L4 superior endplate depression may be degenerative or related to  chronic fracture.  Otherwise no fractures, infection or neoplasm.     Degenerative changes:     L1-L2:   Mild canal narrowing related to symmetric disc bulge.  No significant foraminal narrowing.     L2-L3:   Symmetric disc bulge and facet hypertrophy.  No significant canal or foraminal narrowing.     L3-L4:   Mild canal narrowing related to symmetric disc bulge.  No significant foraminal narrowing.     L4-L5:   Symmetric disc bulge.  No significant canal or foraminal narrowing.     L5-S1:   Symmetric disc bulge and facet hypertrophy.  No significant canal or foraminal narrowing.     IMPRESSION:      1. L1 and L2 superior endplate fractures have an acute appearance. No  retropulsion.     2. T12 and L4 superior endplate fractures have a chronic appearance.       Signature Line  Electronically Signed By: Marcial Elam MD  Date/Time Signed: 11/06/2017 17:16    .       Reexamination/ Reevaluation   Vital signs   Orthostatic Vital Signs   11/6/2017 15:27 CST      Systolic Blood Pressure Supine            138 mmHg                             Diastolic Blood Pressure Supine           76 mmHg                             Pulse Supine              83 bpm                             Systolic Blood Pressure Sitting           128 mmHg                              Diastolic Blood Pressure Sitting          75 mmHg                             Pulse Sitting             89 bpm                             Systolic Blood Pressure Standing          127 mmHg                             Diastolic Blood Pressure Standing         73 mmHg                             Pulse Standing            92 bpm        Impression and Plan   Diagnosis   Thoracic compression fracture (RSC26-BH S22.000A)   Weakness or fatigue (PNED [id number])   Weakness generalized (JWY09-QH R53.1)   Frequent falls (QSK64-AF R29.6)   Parkinsonism (WNR92-DI G20)      Calls-Consults   -  11/6/2017 19:49:00 , Pako BEDOLLA, Amelia Carlson.    Plan   Condition: Unchanged.    Disposition: Admit time  11/6/2017 19:49:00, Admit to Inpatient Unit.    Counseled: Patient, Family, Regarding diagnosis, Regarding diagnostic results, Regarding treatment plan, Patient indicated understanding of instructions.    Notes: I, Jacki Calhoun, acted solely as a scribe for and in the presence of Dr. Troy who performed the service., I, Kalia Troy MD, a physician licensed to practice in this state, have  performed the physical evaluation,  history gathering,  and medical decision making that is reflected in this record..   GLEN Troy MD.

## 2022-09-13 NOTE — ED PROVIDER NOTES
Patient:   Max Omer            MRN: [id number]            FIN: [id number]               Age:   85 years     Sex:  Male     :  10/21/1933   Associated Diagnoses:   Subconjunctival hemorrhage, traumatic; Fall; Low back pain; Fall at nursing home   Author:   Sydni BEDOLLA, Kalia HENNESSY      Basic Information   Time seen: Date & time 2018 12:28:00.   History source: Patient.   Arrival mode: Ambulance.   History limitation: None.   Additional information: Patient's physician(s): Isabel BEDOLLA , Arnie Chappell, 86y/o M presents to the ED with complaints of having a unwitnessed fall at the nursing home on yesterday. Skin tear noted to left elbow. Shana fnp-c, Chief Complaint from Nursing Triage Note : Chief Complaint   2018 12:23 CST     Chief Complaint           ambulated without walker at [facility], unwitnessed fall, small skin tear to left elbow, bloodshot left eye noted. c/o chronic back pain. 81mg ASA daily. GCS 14, at baseline per family.  .      History of Present Illness   The patient presents following 86 y/o white male with hx of Parkinson's, CAD, HTN, and DM presents to the ED via EMS with his daughter, after an unwitnessed fall at [facility] . Pt usually walks with walker, but reports not doing so prior to the fall. During fall, pt might have hit L eye per daughter who states bruising around eye that is new to her. Pt c/o back pain, but notes he has hx of back fracture. .  The onset was just prior to arrival.  The occurrence was single episode.  The fall was described as tripped.  The location where the incident occurred was at a nursing home ([facility]).  Location: Left (elbow skin tear) L eye redness. The character of symptoms is pain.  The degree at present is moderate.  The exacerbating factor is none.  The relieving factor is none.  Risk factors consist of none.  The patient's dominant hand is the left hand.  Therapy today: emergency medical services.  Preceding  symptoms none.  Associated symptoms: back pain (hx of back fracture).  Additional history: none.        Review of Systems   Constitutional symptoms:  Negative except as documented in HPI   Skin symptoms:  Skin tear to L elbow    Eye symptoms:  Reports: Left, conjunctival redness.   ENMT symptoms:  Negative except as documented in HPI.   Respiratory symptoms:  Negative except as documented in HPI   Cardiovascular symptoms:  Negative except as documented in HPI   Gastrointestinal symptoms:  Negative except as documented in HPI.   Genitourinary symptoms:  Negative except as documented in HPI.   Musculoskeletal symptoms:  Back pain.   Neurologic symptoms:  Negative except as documented in HPI.   Psychiatric symptoms:  Negative except as documented in HPI   Endocrine symptoms:  Negative except as documented in HPI.   Hematologic/Lymphatic symptoms:  Negative except as documented in HPI   Allergy/immunologic symptoms:  Negative except as documented in HPI             Additional review of systems information: All other systems reviewed and otherwise negative.      Health Status   Allergies: Pt has no allergies to report.   Medications:  (Selected)   Prescriptions  Prescribed  Sinemet CR 50 mg-200 mg oral tablet, extended release: 2 tab(s), Oral, QID, # 90 tab(s), 3 Refill(s), Pharmacy: [facility]  Zoloft 100 mg oral tablet: 100 mg = 1 tab(s), Oral, Daily, # 30 tab(s), 5 Refill(s), Pharmacy: [facility]  albuterol CFC free 90 mcg/inh inhalation aerosol with adapter: 2 puff(s), INH, q4hr, PRN PRN for wheezing, # 1 EA, 0 Refill(s)  Documented Medications  Documented  ADVAIR HFA   /21: 2 puff(s), INH, BID  ALBUTEROL SULFATE .083 % NEBU: 2.5 mg = 3 mL, NEB, q8hr  Bacmin oral tablet: 1 tab(s), Oral, Daily, # 90 tab(s), 0 Refill(s)  Flovent  mcg/inh inhalation aerosol: 2 puff(s), INH, BID, 0 Refill(s)  JANUVIA 100 MG TABS: 100 mg = 1 tab(s), Oral, Daily  Lantus 100 units/mL subcutaneous inj.: 5 units,  Subcutaneous, Once a day (at bedtime), # 10 mL, 0 Refill(s)  SERTRALINE HCL 50 MG TABS: 50 mg = 1 tab(s), Oral, Daily  Tylenol Extra Strength 500 mg oral tablet: 500 mg = 1 tab(s), Oral, q4hr, PRN PRN as needed for pain, # 24 tab(s), 0 Refill(s)  aspirin 81 mg oral tablet: 81 mg = 1 tab(s), Oral, Daily, # 30 tab(s), 0 Refill(s)  carvedilol 6.25 mg oral tablet: 6.25 mg = 1 tab(s), Oral, BID, # 60 tab(s), 0 Refill(s)  glimepiride 4 mg oral tablet: 4 mg = 1 tab(s), Oral, BID, # 90 tab(s), 0 Refill(s)  isosorbide DInitrate 20 mg oral tablet: 20 mg = 1 tab(s), Oral, BID, # 120 tab(s), 0 Refill(s)  lisinopril 10 mg oral tablet: 10 mg = 1 tab(s), Oral, Daily, # 90 tab(s), 0 Refill(s)  metformin 500 mg oral tablet: 500 mg = 1 tab(s), Oral, BID, # 180 tab(s), 0 Refill(s)  pravastatin 40 mg oral tablet: 40 mg = 1 tab(s), Oral, Daily, # 90 tab(s), 0 Refill(s)  tamsulosin 0.4 mg oral capsule: 0.4 mg = 1 cap(s), Oral, Daily, # 90 cap(s), 0 Refill(s).   Immunizations: Influenza and pneumococcal vaccines are UTD but not tetanus in last 10 yrs.      Past Medical/ Family/ Social History   Medical history:    Resolved  Prostate cancer ([id number]):  Resolved.  Parkinson disease ([id number]):  Resolved.,   CAD - Coronary artery disease   Diabetic acidosis   Hypertension   Obesity   Stroke   Tremors of nervous system   .   Surgical history:    Back fusion ([id number]) in 1988 at 55 Years.  Cataract surgery ([id number]).  Heart valve repair ([id number])..   Family history:    Heart failure.  Mother  Lung cancer.  Father  Brother  Sister  .   Social history: Alcohol use: Denies, Tobacco use: Denies, Drug use: Denies, Family/social situation: , nursing home resident, Wife lives across street from NH in own home.      Physical Examination               Vital Signs   Vital Signs   11/28/2018 12:23 CST     Temperature Oral          36.7 DegC                             Temperature Oral  (calculated)             98.06 DegF                             Peripheral Pulse Rate     77 bpm                             Respiratory Rate          16 br/min                             SpO2                      99 %                             Systolic Blood Pressure   166 mmHg  HI                             Diastolic Blood Pressure  89 mmHg  .   Measurements   11/28/2018 12:23 CST     Weight Dosing             77 kg                             Weight Measured and Calculated in Lbs     169.75 lb                             Weight Estimated          77 kg                             Height/Length Dosing      157.48 cm                             Height/Length Estimated   157.48 cm                             Body Mass Index Estimated 31.05 kg/m2  .   Basic Oxygen Information   11/28/2018 12:23 CST     SpO2                      99 %                             Oxygen Therapy            Room air  .   General:  Alert, no acute distress, Elderly white male, Not ill-appearing,    Yina coma scale:  Eye response: 4 /4, verbal response: 5 /5, motor response: 6 /6, Total score: Total score: 15.    Neurological:  Alert and oriented to person, place, time, and situation, No focal neurological deficit observed, CN II-XII intact, normal sensory observed, normal motor observed, normal speech observed, normal coordination observed, No marked tremor associated with Parkinson's disease, Slow moving.    Skin:  Warm, dry, no rash, normal for ethnicity, Skin tear to L external epicondyle elbow 1x4 cm   Head:  Normocephalic, atraumatic.    Neck:  Supple, trachea midline, no tenderness, no JVD, no carotid bruit.    Eye:  Extraocular movements are intact, Subconjunctival hemorrhage noted to L lower 1 half of L eye. Round and briskly reactive to light, Able to read small print 14 inches in L eye. Difficulty with R eye, Patient has an impressive traumatic conjunctival hemorrhage on the left eye.  Right eye is no signs of trauma yet  the vision is documented to be much better than the right eye he can see okay out his right eye he denies any new problems he is able to read small print at 14 inches with the left eye that he could not with the right.    Ears, nose, mouth and throat:  Tympanic membranes clear, oral mucosa moist, no pharyngeal erythema or exudate.    Cardiovascular:  Regular rate and rhythm, No murmur, Normal peripheral perfusion, No edema.    Respiratory:  Lungs are clear to auscultation, respirations are non-labored, breath sounds are equal.    Chest wall:  No tenderness.   Back:  Nontender, Normal range of motion, Normal alignment, no step-offs.    Musculoskeletal:  Normal ROM, normal strength, no tenderness, no swelling.    Gastrointestinal:  Soft, Nontender, Non distended, Normal bowel sounds, No organomegaly   Genitourinary:  no CVA tenderness.   Lymphatics:  No lymphadenopathy.   Psychiatric:  Cooperative, appropriate mood & affect, normal judgment.       Medical Decision Making   Trauma team:  Trauma criteria met.   Differential Diagnosis:  Fall, abrasion, sub conjunctival hemorrhage.    Documents reviewed:  Emergency department nurses' notes.   Orders  Launch Orders   Radiology:  CT Cervical Spine W/O Contrast (Order): Stat, 11/28/2018 12:31 CST, Other (please specify), fall, None, Stretcher, Patient Has IV?, Rad Type, Schedule this test  CT Head W/O Contrast (Order): Stat, 11/28/2018 12:31 CST, Other (please specify), fall, None, Stretcher, Patient Has IV?, Rad Type, Schedule this test.   Radiology results:  Rad Results (ST)  < 12 hrs   Accession: [id number]  Order: CT Lumbar Spine W/O Contrast  Report Dt/Tm: 11/28/2018 14:15  Report:      CLINICAL: Fall.     TECHNIQUE: Multidetector axial images were performed of the lumbar  spine without contrast and the images were reformatted.      Automated exposure control was utilized to minimize radiation dose.     COMPARISON: November 6, 2017.     FINDINGS: Interval progressed  loss of stature of L2 vertebral body  compression deformity along the superior endplate with slight similar  retropulsed bony fragment into the central canal. This progression  compression deformity of L2 vertebral body is not convinced to be  acute without significant paraspinal soft tissue inflammations.  Interval stability of additional compression deformities along the  superior endplates of T12, L1 and L4 vertebral body with similar loss  of stature.     Disc segmental analysis is given below:     At L1-L2, there is disc bulge and facets arthropathy. Central canal is  adequate. No compression upon the exiting nerve roots.     At L2-L3, there is disc centrally which indents the ventral thecal  sac. Bilateral neuroforamen are adequate     At L3-L4, there is moderate acquired central canal stenosis caused by  disc bulge, ligamentum flavum thickening and facets arthropathy.  Narrowing of the right neuroforamen.     At L4-L5, there is disc bulge and facets arthropathy. Right  decompression laminectomy. Thecal sac is adequate. There are bilateral  narrowings of the proximal neuroforamen.     At L5-S1, disc height is preserved. Bilateral facets arthropathy.  Central canal is not stenosed. Mild narrowing of the left  neuroforamen.     IMPRESSION:     1.  L2 vertebral body compression deformity with progressed loss of  stature since 2017 exam. This is not convinced to represent acute on  chronic change without significant paraspinal soft tissue  inflammations. If patient remain symptomatic, further assessment with  MRI exam may also be considered.  2. Additional stable thoracolumbar vertebrae compression deformities.      Accession: [id number]  Order: CT Cervical Spine W/O Contrast  Report Dt/Tm: 11/28/2018 14:12  Report:   History: fall;Other (please specify)  Comparison studies:  Cervical spine CT 10/14/2018     Technique:   Axial CT images were obtained through the cervical region.  Coronal and sagittal  reconstructions obtained from the axial data.  Automatic exposure control was utilized to limit radiation dose.  Contrast: None.     Radiation Dose:   Total DLP: 466 mGy*cm     DISCUSSION:     Fractures: None.  Alignment: Straightening of the normal lordosis. No subluxations.  Soft tissues: No abnormalities.     Degenerative changes:  Mild degenerative canal stenosis at C6-7 related to disc osteophyte  complex.  Mild left foraminal narrowing at C5-6, moderate bilaterally at C6-7  related to uncovertebral and facet hypertrophy.     IMPRESSION:     1. No fractures. No significant interval change from 10/14/2018.     2. Ligament, spinal cord and/or vascular abnormalities cannot be  excluded on the basis of this examination.      Accession: [id number]  Order: CT Head W/O Contrast  Report Dt/Tm: 11/28/2018 14:08  Report:   CT HEAD WITHOUT CONTRAST:     HISTORY: fall;Other (please specify)          PATIENT RADIATION DOSE: TOTAL DLP(mGycm)  1190      As per PQRS measures, all CT scans at this facility used dose  modulation, iterative reconstruction, and/or weight based dose  adjustment when appropriate to reduce radiation dose to as low as  reasonably achievable.     COMPARISON: October 14, 2018          FINDINGS: Again there is atrophy and chronic ischemic change. No acute  hemorrhage or change in ventricular caliber. The globes are unchanged.  No acute fracture or paranasal sinus air-fluid level.     IMPRESSION: No acute intracranial finding or detrimental change since  recent prior exam    .      Reexamination/ Reevaluation   Time: 11/28/2018 15:01:00 .   Vital signs   results included from flowsheet : Vital Signs   11/28/2018 14:30 CST     Peripheral Pulse Rate     66 bpm                             Heart Rate Monitored      66 bpm                             Respiratory Rate          12 br/min                             SpO2                      95 %                             Oxygen Therapy            Room air                              Systolic Blood Pressure   108 mmHg                             Diastolic Blood Pressure  59 mmHg  LOW                             Mean Arterial Pressure, Cuff              75 mmHg     Assessment: I have spoken to the daughter and the patient there is nothing new or acute on his x-rays minimum settling of the lumbar injury nothing that looks like an acute fracture they understand aerated to get dressed and go home..      Impression and Plan   Diagnosis   Subconjunctival hemorrhage, traumatic (HZB34-TH H11.30)   Subconjunctival hemorrhage, traumatic (FME98-CL H11.30)   Fall (PNED [id number])   Low back pain (YON91-HO M54.5)   Fall at nursing home (AOZ84-DF W19.XXXA)      Calls-Consults   Plan   Condition: Unchanged.    Disposition: Discharged: Time  11/28/2018 15:04:00, to a nursing home.    Patient was given the following educational materials: Subconjunctival Hemorrhage, Subconjunctival Hemorrhage.    Follow up with: Report to Emergency Department if symptoms return or worsen Call for followup appointment; Anytime the conditions worsen, return to clinic or go to ED; Arnie Hall MD Follow-up with PCP in 3 to 5 days as needed.    Counseled: Patient, Regarding diagnosis, Regarding diagnostic results, Regarding treatment plan, Patient understood.    Notes: I, Silas Hill, acted solely as a scribe for and in the presence of Dr. Troy who performed the service. , I, Kalia Troy MD, a physician licensed to practice in this state, have  performed the physical evaluation,  history gathering,  and medical decision making that is reflected in this record..   GLEN Troy MD.

## 2022-09-13 NOTE — HISTORICAL [facility] CERNER
This is a historical note converted from [place]. Formatting and pictures may have been removed.  Please reference [facility] for original formatting and attached multimedia. Chief Complaint  pt to ER via [facility] for weakness. family states increased falls. pt only complains of chronic back pain. pt CBG with .  Reason for Consultation  pd, compression fractures  History of Present Illness  84cm w/?PD well known to dr. seaman from clinic  other pmh: hth, vhd s/p procine valve, cad, dm2, cva, hld, prostate CA (remote)  presented to ed on 11/6/17 with increasing weakness & falls for the week PTA.  he was falling because my legs shake & get weak  he fell onto his bed while trying to get dressed on saturday  he fell into a door while ambulating on sunday; hit his head but no LOC  since the fall on sunday, hed been having LBP & came to ER for eval  ambulation was quite difficulty at baseline per family  at baseline, he ambulates w/ walker & is mostly iADLs  ?   cth negative for acute findings  ct L-spine: L1 & L2 superior endplate fracture w/ acute appearance, no retropulsion as well as a T12 & L4 superior endplate fractures but they appear chronic.  ekg: nsr w/ L-bbb  ?   was seen by nsgy who recommended brace & conservative management  he had some mild ARF on admit so lasix was stopped & he was placed on IVF; had improvement on repeat labs  has been working with pt/ot - ambulated 1000ft today  denies pain, states brace is helping  has not taken any pain meds  he states that his PD is under control - on sinemet /400 qid (two 50/200mg tabs qid)  ?   currently awaiting rehab placement/acceptance (attempting to get [facility]), no beds currently  Review of Systems  CONSTITUTIONAL: As per HPI  EYE: Negative except for those listed in HPI?  ENMT: No tinnitus, hearing loss  RESPIRATORY: No SOB, cough  CARDIOVASCULAR: No chest pain, palpitations  GASTROINTESTINAL: No nausea, vomiting, diarrhea  GENITOURINARY: No incontinence,  urinary problems  MUSCULOSKELETAL: No joint pain, swelling  NEUROLOGICAL: Negative except for those listed in HPI?  PSYCHOLOGICAL: Negative except for those listed in HPI?  ENDOCRINE: Negative except for those listed in HPI  ALL OTHER ROS: Reviewed and negative.  ?  no constipation/urinary issues  moods ok  sleep ok  Physical Exam  Vitals & Measurements  T:?36.3? ?C ?(Oral)? TMIN:?36.3? ?C ?(Oral)? TMAX:?36.7? ?C ?(Oral)? HR:?69?(Peripheral)? RR:?18? BP:?132/71? SpO2:?97%?  GENERAL:?NAD, calm, cooperative, appropriate  RESP: CTAB  HEART: RRR  no LE edema  MENTAL STATUS: Oriented x4, follows commands reliably  SPEECH/LANGUAGE: some dysarthria (but improved per family)  CN:  perrla, EOMI, VFF, gaze conjugate  No tactile or motor facial asymmetry  t/p midline  Motor:?No focal weakness  mild bradykinesis bue  no rigidity  minimal L-hand resting tremor  Cerebellar: No?tremor or dysmetria  Sensory: Normal to tactile stim. /vibration  DTRs: Normal (+2)  Gait: not observed  Assessment/Plan  PD  L1, L2 compression fractures (acute); T12, L4 fractures (chronic)  ?  ?  ?  cont sinemet cr at current dosing  cont ST  consider kypho - will defer to md gonzalez w/ rehab placement if accepted (attempting to obtain placement at [facility] rehab)  further to follow from dr. seaman   Problem List/Past Medical History  Ongoing  CAD - Coronary artery disease  Diabetic acidosis  Hypertension  Obesity  Stroke  Tremors of nervous system  Historical  Parkinson disease  Prostate cancer  Procedure/Surgical History  Back fusion (1988)  Cataract surgery  Heart valve repair  Medications  Inpatient  albuterol, 2.5 mg= 3 mL, NEB, q4hr, PRN  aspirin 81 mg oral tablet, CHEWABLE, 81 mg= 1 tab(s), Oral, Daily  Coreg 3.125 mg oral tablet, 6.25 mg= 2 tab(s), Oral, BID  Dextrose 50% and Water (50 mL vial/syringe), 25 gm= 50 mL, IV Push, As Directed, PRN  Dextrose 50% and Water (50 mL vial/syringe), 12.5 gm= 25 mL, IV Push, As Directed, PRN  Dextrose 50% and  Water (50 mL vial/syringe), 12.5 gm= 25 mL, IV Push, Once, PRN  Dextrose 50% in Water, 12.5 gm= 25 mL, IV Push, As Directed, PRN  glucagon, 1 mg= 1 EA, IM, q10min, PRN  glucagon, 1 mg= 1 EA, IM, q10min, PRN  hydrALAZINE (Apresoline) Inj., 10 mg= 0.5 mL, IV Push, q2hr, PRN  insulin lispro, 2-14 units, Subcutaneous, As Directed, PRN  isosorbide DInitrate 20 mg oral tablet, 20 mg= 1 tab(s), Oral, BID  labetalol 5 mg/mL intravenous solution, 10 mg= 2 mL, IV Push, q2hr, PRN  lisinopril 10 mg oral tablet, 10 mg= 1 tab(s), Oral, Daily  metFORMIN, 500 mg= 1 tab(s), Oral, BID  Norco 5 mg-325 mg oral tablet, 1 tab(s), Oral, q4hr, PRN  Protonix, 40 mg= 1 EA, IV Slow, Daily  sertraline, 100 mg= 2 tab(s), Oral, Daily  simvastatin 20 mg oral tablet, 20 mg= 1 tab(s), Oral, Daily  Sinemet CR 25 mg-100 mg oral tablet, extended release, 4 tab(s), Oral, QID  sitaGLIPtin, 100 mg= 2 tab(s), Oral, Daily  tamsulosin 0.4 mg oral capsule, 0.4 mg= 1 cap(s), Oral, Daily  Home  albuterol CFC free 90 mcg/inh inhalation aerosol with adapter, 2 puff(s), INH, q4hr, PRN  aspirin 81 mg oral tablet, 81 mg= 1 tab(s), Oral, Daily  Bacmin oral tablet, 1 tab(s), Oral, Daily  carvedilol 6.25 mg oral tablet, 6.25 mg= 1 tab(s), Oral, BID  glimepiride 4 mg oral tablet, 4 mg= 1 tab(s), Oral, BID  isosorbide DInitrate 20 mg oral tablet, 20 mg= 1 tab(s), Oral, BID  JANUVIA 100 MG TABS, 100 mg= 1 tab(s), Oral, Daily  lisinopril 10 mg oral tablet, 10 mg= 1 tab(s), Oral, Daily  metformin 500 mg oral tablet, 500 mg= 1 tab(s), Oral, BID  pravastatin 40 mg oral tablet, 40 mg= 1 tab(s), Oral, Daily  Sinemet CR 50 mg-200 mg oral tablet, extended release, 2 tab(s), Oral, QID, 3 refills  tamsulosin 0.4 mg oral capsule, 0.4 mg= 1 cap(s), Oral, Daily  Zoloft 100 mg oral tablet, 100 mg= 1 tab(s), Oral, Daily, 5 refills  Allergies  No Known Medication Allergies  Social History  Alcohol - Denies Alcohol Use, 09/25/2015  Past, Beer, Alcohol use interferes with work or home:  No. Drinks more than intended: No. Others hurt by drinking: No. Ready to change: No. Household alcohol concerns: No.  Substance Abuse - 09/25/2015  Never  Tobacco - Denies Tobacco Use, 09/25/2015  Former smoker  Family History  Heart failure.: Mother.  Lung cancer.: Father, Sister and Brother.  Lab Results  Test Name Test Result Date/Time   Sodium Lvl 140 mmol/L 11/08/2017 05:00 CST   Potassium Lvl 4.5 mmol/L 11/08/2017 05:00 CST   Chloride 108 mmol/L (High) 11/08/2017 05:00 CST   CO2 25.0 mmol/L 11/08/2017 05:00 CST   Calcium Lvl 7.9 mg/dL (Low) 11/08/2017 05:00 CST   Glucose Lvl 162 mg/dL (High) 11/08/2017 05:00 CST   BUN 33.0 mg/dL (High) 11/08/2017 05:00 CST   Creatinine 1.07 mg/dL 11/08/2017 05:00 CST   Hgb A1c 8.2 % (High) 11/07/2017 04:43 CST   WBC 6.3 x10(3)/mcL 11/08/2017 05:00 CST   RBC 3.33 x10(6)/mcL (Low) 11/08/2017 05:00 CST   Hgb 10.5 gm/dL (Low) 11/08/2017 05:00 CST   Hct 31.3 % (Low) 11/08/2017 05:00 CST   Platelet 143 x10(3)/mcL 11/08/2017 05:00 CST   UA Nitrite Negative UA 11/06/2017 14:32 CST   UA Leuk Est Negative UA 11/06/2017 14:32 CST   UA WBC NONE SEEN 11/06/2017 14:32 CST   UA Bacteria NONE SEEN 11/06/2017 14:32 CST   Diagnostic Results  ct l-spine  1. L1 and L2 superior endplate fractures have an acute appearance. No retropulsion.  2. T12 and L4 superior endplate fractures have a chronic appearance.? [1]  ?  ct head  No acute intracranial abnormalities.?? [2]     [1]?CT Lumbar Spine W/O Contrast; Marcial Elam MD 11/06/2017 17:03 CST  [2]?CT Head W/O Contrast; Marcial Elam MD 11/06/2017 17:03 CST   I have performed a history and physical examination of the patient and discussed the management with the nurse practitioner.  ?   CT L-spine personally reviewed: T12, L1, L2, L4 fractures  ?   [x ] I reviewed the nurse practitioners note and agree with the documented findings and plan of care.  ?   Continue bracing, therapy  I will do kypho if pain warrants; presently pain is  controlled  ?  [ ] I reviewed the nurse practitioners note and agree with the documented findings and plan of care except:  ?

## 2022-09-13 NOTE — H&P
"   Patient:   Max Omer             MRN: [id number]            FIN: [id number]               Age:   84 years     Sex:  Male     :  10/21/1933   Associated Diagnoses:   None   Author:   Tino Min MD      Basic Information   Time Seen:  Date & Time 2017 22:26:00.    Source of history:  Self.    Referral source:  Emergency department.    History limitation:  None.    Advance directive:  Full code.    Provider information/ cc:  Dr. Hall (PCP), Dr. Ware (Neurology).       Chief Complaint   lower back pain and falls      History of Present Illness   Mr. Omer is a 85 yo CM with pmhx of  Parkinsons Disease, HTN, VHD s/p porcine valve, CAD, DMII, CVA, HLD and Prostate Cancer (remote) who presents to the ED with c/o increasing  weakness and  falls for the past week. Pt fell early last week, was seen by Dr. Ware on Friday (11/3) and Sinimet was increased.  Pt fell again on Saturday () and  (). Pt states "I fall because my legs shake and get weak". Pt fell onto his bed on Saturday while trying to get dressed. there was no LOC or head injury. The second fall was while he was walking, pt states he fell onto the side of the door. He did hit his head on the door but there was no LOC.  Since then pt has been experiencing some lower back pain so he was brought in to the ED for further evaluation. Pt denies unilateral weakness, facial droop, near syncope, dizziness, palpitations, CP, abd  pain, nausea, vomitting, diarrhea, urinary complaints, or bowel/bladder incontinence. Pt is able to ambulate but is very difficult per family. At baseline, he ambulates with walker and in able to preform most ADLs independently. He is on lasix daily, he denies hx of CHF, states my legs swell when i sit down so I take a fluid pill.   Initial ED VS: Temp 36.5, HR 90, R 18, /74, 02 97% RA. Labs remarkable for BUN/ Crt 48/2.01, glucose 237.  CT Head negative for acute intracranial process, " incidental findings of carotid siphon and vertebrobasilar arthersclerotic vascular calcifications . CT Lumbar Spine reported L1 and L2 superior endplate fractures that have an acute appearance, no retropulsion and T12 and L4 superior endplate fractures but these have a chronic appearance. EKG: NSR with LBBB (unchanged from previous EKG In August 2017).  Pt is admitted to the Hospitalist Service with consult to Neurosurgery in AM. .            Review of Systems   Except as documented, all other systems reviewed and negative      Health Status   Allergies:    Allergic Reactions (Selected)  No Known Medication Allergies,    Allergies (1) Active Reaction  No Known Medication Allergies None Documented     Current medications:  (Selected)   Inpatient Medications  Ordered  Protonix: 40 mg, form: Injection, IV Slow, Daily, first dose 11/06/17 20:57:00 CST, 66,022  Sodium Chloride 0.9% 1000mL 1,000 mL: 1,000 mL, 1,000 mL, IV, 250 mL/hr, start date 11/06/17 15:09:00 CST  albuterol: 3 mL, 2.5 mg =, form: Soln-Inh, NEB, q4hr PRN for wheezing, first dose 11/06/17 20:57:00 CST  Prescriptions  Prescribed  Rolling walker with seat: Rolling walker with seat, See Instructions, Use as needed, # 1 units, 0 Refill(s)  Seated bike pedal device: Seated bike pedal device, See Instructions, Use as needed, # 1 units, 0 Refill(s)  Sinemet CR 50 mg-200 mg oral tablet, extended release: 2 tab(s), Oral, QID, # 90 tab(s), 3 Refill(s), Pharmacy: [facility]  Zoloft 100 mg oral tablet: 100 mg = 1 tab(s), Oral, Daily, # 30 tab(s), 5 Refill(s), Pharmacy: [facility]  albuterol CFC free 90 mcg/inh inhalation aerosol with adapter: 2 puff(s), INH, q4hr, PRN PRN for wheezing, # 1 EA, 0 Refill(s)  Documented Medications  Documented  Bacmin oral tablet: 1 tab(s), Oral, Daily, # 90 tab(s), 0 Refill(s)  CEFDINIR 300 MG CAPS: 300 mg = 1 cap(s), Oral, BID  JANUVIA 100 MG TABS: 100 mg = 1 tab(s), Oral, Daily  Lasix 40 mg oral tablet: 40 mg = 1 tab(s),  Oral, BID, # 90 tab(s), 0 Refill(s)  aspirin 81 mg oral tablet: 81 mg = 1 tab(s), Oral, Daily, # 30 tab(s), 0 Refill(s)  carvedilol 6.25 mg oral tablet: 6.25 mg = 1 tab(s), Oral, BID, # 60 tab(s), 0 Refill(s)  glimepiride 4 mg oral tablet: 4 mg = 1 tab(s), Oral, BID, # 90 tab(s), 0 Refill(s)  isosorbide DInitrate 20 mg oral tablet: 20 mg = 1 tab(s), Oral, BID, # 120 tab(s), 0 Refill(s)  lisinopril 10 mg oral tablet: 10 mg = 1 tab(s), Oral, Daily, # 90 tab(s), 0 Refill(s)  metformin 500 mg oral tablet: 500 mg = 1 tab(s), Oral, BID, # 180 tab(s), 0 Refill(s)  potassium chloride 20 mEq oral TABLET extended release: 20 mEq = 1 tab(s), Oral, Daily, # 30 tab(s), 0 Refill(s)  pravastatin 40 mg oral tablet: 40 mg = 1 tab(s), Oral, Daily, # 90 tab(s), 0 Refill(s)  tamsulosin 0.4 mg oral capsule: 0.4 mg = 1 cap(s), Oral, Daily, # 90 cap(s), 0 Refill(s)      Histories     Past Medical History: HTN, CAD, Parkinsons, VHD,  HTN, DMII, prostate cancer (remote)  Past Surgical History:Back Fusion, cataract surgery, valve heart repair  Family History:  Father Lung Cancer, Sister Lung Cancer; Mother CHF  Social History:  No alcohol, tobacco or illicit drug use. Former tobacco use quit 20 yrs ago.       Physical Examination      Vital Signs (last 24 hrs)_____  Last Charted___________  Temp Oral     36.3 DegC  (NOV 06 21:02)  Heart Rate Apical    76 bpm  (NOV 06 21:02)  Resp Rate         20 br/min  (NOV 06 21:02)  SBP      122 mmHg  (NOV 06 21:02)  DBP      72 mmHg  (NOV 06 21:02)  SpO2      98 %  (NOV 06 21:02)     General:  Alert and oriented, No acute distress, elderly male appears stated age, in no acute distress.    Cognition and Speech:  Oriented, Speech clear and coherent.    HENT:  Normocephalic, Normal hearing, Oral mucosa is moist.    Eye:  Pupils are equal, round and reactive to light, Normal conjunctiva.    Neck:  Supple, No carotid bruit, No jugular venous distention.    Respiratory:  Lungs are clear to auscultation,  Respirations are non-labored, Breath sounds are equal.    Cardiovascular:  Normal rate, Regular rhythm, No murmur, Good pulses equal in all extremities, Normal peripheral perfusion, No edema.    Gastrointestinal:  Soft, Non-tender, Non-distended, Normal bowel sounds.    Integumentary:  Warm, Dry, Intact.    Musculoskeletal:  No tenderness, No swelling, limited ROM due to pain.    Neurologic:  Alert, Oriented, Normal sensory, No focal deficits, resting tremor to BUE.    Psychiatric:  Cooperative, Appropriate mood & affect, Normal judgment.       Review / Management   Laboratory Results   Today's Lab Results : PowerNote Discrete Results   11/6/2017 14:59 CST      POC Troponin              0.01 ng/mL    11/6/2017 14:32 CST      UA Appear                 CLEAR                             UA Color                  YELLOW                             UA Spec Grav              1.012                             UA Bili                   Negative                             UA pH                     5.5                             UA Urobilinogen           0.2                             UA Blood                  Negative                             UA Glucose                Negative                             UA Ketones                Negative                             UA Protein                Negative                             UA Nitrite                Negative                             UA Leuk Est               Negative                             UA WBC                    NONE SEEN /HPF                             UA RBC                    NONE SEEN                             UA Bacteria               NONE SEEN /HPF                             UA Squam Epithelial       NONE SEEN    11/6/2017 14:04 CST      WBC                       7.0 x10(3)/mcL                             RBC                       4.07 x10(6)/mcL  LOW                             Hgb                       12.9 gm/dL  LOW                              Hct                       38.3 %  LOW                             Platelet                  167 x10(3)/mcL                             MCV                       94.1 fL  HI                             MCH                       31.7 pg  HI                             MCHC                      33.7 gm/dL                             RDW                       13.0 %                             MPV                       10.0 fL                             Abs Neut                  5.08 x10(3)/mcL                             Neutro Auto               73 %  NA                             Lymph Auto                16 %  NA                             Mono Auto                 8 %  NA                             Eos Auto                  2 %  NA                             Abs Eos                   0.2 x10(3)/mcL                             Basophil Auto             0 %  NA                             Abs Neutro                5.08 x10(3)/mcL                             Abs Lymph                 1.1 x10(3)/mcL                             Abs Mono                  0.6 x10(3)/mcL                             Abs Baso                  0.0 x10(3)/mcL                             Sodium Lvl                136 mmol/L                             Potassium Lvl             4.7 mmol/L                             Chloride                  101 mmol/L                             CO2                       24.0 mmol/L                             Calcium Lvl               8.9 mg/dL                             Glucose Lvl               237 mg/dL  HI                             BUN                       48.0 mg/dL  HI                             Creatinine                2.01 mg/dL  HI                             eGFR-AA                   41 mL/min/1.73 m2  NA                             eGFR-ASIA                  34 mL/min/1.73 m2  NA                             Bili Total                0.4 mg/dL                             Bili Direct                0.10 mg/dL                             Bili Indirect             0.30 mg/dL                             AST                       14 unit/L  LOW                             ALT                       10 unit/L  LOW                             Alk Phos                  192 unit/L  HI                             Total Protein             7.6 gm/dL                             Albumin Lvl               3.90 gm/dL                             Globulin                  3.70 gm/dL  HI                             A/G Ratio                 1.1 ratio        Radiology results   Rad Results (ST)   Accession: [id number]  Order: CT Lumbar Spine W/O Contrast  Report Dt/Tm: 11/06/2017 17:18    IMPRESSION:      1. L1 and L2 superior endplate fractures have an acute appearance. No  retropulsion.     2. T12 and L4 superior endplate fractures have a chronic appearance.      Accession: [id number]  Order: CT Head W/O Contrast  Report Dt/Tm: 11/06/2017 17:10  Report:   History: Syncope       Incidental findings:   Carotid siphon and vertebrobasilar atherosclerotic vascular  calcifications.  Status post bilateral cataract surgery.     IMPRESSION:     No acute intracranial abnormalities.          Accession: [id number]  Order: XR Chest 2 Views  Report Dt/Tm: 11/06/2017 13:58  Report:   Clinical History  Chest Pain     Impression  No acute cardiopulmonary process.     Electrocardiogram:  Time 11/6/2017 15:25:00, rate 83, normal sinus rhythm, No ST-T changes, no ectopy, EP Interp, His rhythm with a left bundle branch block there is an old EKG dated 1 August 2017 heart rate was 79 at this time up  4 beats now bundle-branch block appears to be already present no acute ST or T-wave changes appreciated.           Impression and Plan   L1 and L2 Compression Fracture s/p Fall  - with chronic appearing T12 and L4 fractures per CT report  - NPO except home meds for now  - Consult neurosurgery  - PRN analgesics    XIMENA vs CKD ( pt denies hx  of CKD, last Crt in 8/2017 Crt 1.88)  - Hold Lisinopril and Metformin for now  - UA negative for proteinuria, check Urine Na and Urine Crt  - gentle hydration; avoid nephrotoxic agents  - BMP in AM; consider renal US     BLE weakness/Falls  - fall precautions; bed rest for now    Parkinsons Disease   - resume home meds  - Dr. 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 Min    For this patient encounter, I have reviewed NPs documentation,treatment plan and medical decision making and had face to face encounter with patient.   84 year old gentleman w/ PD, h/o lumbar spine compression fx's (osteoporosis?) - Legs shake > fall > new L1/L2 compression fractures on CT tonite presumably due to most recent fall?  Pt not in pain currently but fearful of add'l falls and has multiple other medical issues (azotemia, uncontrolled DM, VHD, h/o prostate cancer - last PSA?)  Plan is as above.  Tino Min MD    Code status: Full  DVT prophylaxis: SCDs  GI: PPI  Admission time 76 minutes.

## 2022-09-13 NOTE — CONSULTS
DATE OF CONSULTATION:  11/07/2017    ATTENDING PHYSICIAN:  Dr. Tino Min MD  CONSULTING PHYSICIAN:  Ced Hubbard MD    DICTATED BY:  Candie Randall    REASON FOR CONSULTATION:  L1 and L2 compression fractures, status post fall.    HISTORY OF PRESENT ILLNESS:  The patient is an 84-year-old male who has a history of Parkinson disease, diabetes mellitus, hypertension, coronary artery disease, valvular replacement, and dyslipidemia.  He has fallen quite a bit in the past couple weeks secondary to generalized weakness.  It is likely having something to do with his Parkinson disease.  He was seen by Dr. Ware last week and medications were adjusted.  However, he fell once again, and due to weakness and persistent falls as well as low back pain he was taken to [facility] Emergency Department.  CT of the lumbar spine was performed on 11/6/17 showing L1 and L2 superior endplate fractures.  Dr. Hubbard was consulted for neurosurgical evaluation and care.    PAST MEDICAL HISTORY:  Significant for Parkinson disease, diabetes mellitus, hypertension, coronary artery disease status post valve replacement, and history of prostate cancer.    PREVIOUS SURGERIES:  Include lumbar fusion, cataract surgery, coronary valve replacement.    FAMILY HISTORY:  Significant for lung cancer and congestive heart failure.    SOCIAL HISTORY:  The patient does not use alcohol or tobacco.  He quit smoking 20 years ago.    HOME MEDICATIONS:    1. Sinemet 50/200 two q.i.d.     2. Zoloft 100 mg daily.    3. Albuterol 2 puffs q.4 hours p.r.n.     4. Flomax 0.4 mg daily.    5. Multivitamin daily.    6. Pravastatin 40 mg daily.    7. Metformin 500 mg b.i.d.    8. Glimepiride 4 mg b.i.d.    9. Lisinopril 10 mg daily.    10. Isosorbide 20 mg b.i.d.    11. Coreg 6.25 mg b.i.d.    12. Aspirin 81 mg daily.    13. Januvia daily.    REVIEW OF SYSTEMS:  The patient is lying in bed.  He is comfortable.  He reports he does have some lower back  pain with movement.  He also complains of generalized weakness.  Otherwise, review of systems is negative with the exception of what is listed above in the HPI and past medical history.    PHYSICAL EXAMINATION:  GENERAL:  The patient is an elderly male, lying in a hospital bed in no apparent distress.   VITAL SIGNS:  Blood pressure to be 172/94, pulse is 90, respirations 18 per minute.   HEAD:  Normocephalic, atraumatic.     NECK:  Supple.     NEUROLOGIC:  The patient is awake and alert.  He is appropriate and knowledgeable.  He is appropriate and a good historian.  Motor examination shows generalized weakness.  He has minimal tenderness to palpation at the back.  Gait is not assessed.    IMPRESSION:    1. L1 and L2 compression fractures.  2. Status post fall.    PLAN:  The patient will be treated conservatively with a brace.  We will have PT to help with ambulation.        ______________________________  MD HA Knapp/  DD:  12/06/2017  Time:  04:15PM  DT:  12/06/2017  Time:  04:51PM  Job #:  [id number]     I have examined the patient as well as reviewed the chart, the imaging studies and the consultation note. I agree with the above.

## 2022-09-13 NOTE — ED PROVIDER NOTES
Patient:   Max Omer            MRN: [id number]            FIN: [id number]               Age:   84 years     Sex:  Male     :  10/21/1933   Associated Diagnoses:   Closed head injury; Periorbital contusion; Facial abrasion; Subconjunctival hemorrhage   Author:   Janeth Blake MD      Basic Information   Time seen: Date & time 3/14/2018 01:39:00.   History source: Patient.   Arrival mode: Ambulance.   History limitation: Cognitive impairment.   Additional information: Chief Complaint from Nursing Triage Note : Chief Complaint   3/14/2018 0:23 CDT       Chief Complaint           EMS: from [facility]: found on the ground, unwitness fall (either from standing or w/c), unknown LOC. GCS 15 at present. bruising/edema to lower left eye/left cheek. denies pain. denies neck pain/back pain. -blood thinners  .      History of Present Illness   The patient presents following 85 y/o WM presents to the ED via EMS from nursing home due to unwitnessed fall. Pt was found on floor near bed, it is unknown if the fall was from the bed, wheelchair or pt standing. Pt denies any pain, palpitations, dizziness, or SOB. History limited due to chronic dementia (reported by NH at his neurologic baseline).  The onset was just prior to arrival.  The occurrence was single episode.  The fall was described as unknown, found on floor.  The location where the incident occurred was at a nursing home.  Location: Left face. The character of symptoms is swelling and redness.  The degree at present is moderate.  The exacerbating factor is none.  The relieving factor is none.  Risk factors consist of age.  The patient's dominant hand is unknown.  Therapy today: emergency medical services.  Preceding symptoms none.  Associated symptoms: denies chest pain, denies dizziness, denies headache, denies nausea, denies vomiting, denies abdominal pain and denies shortness of breath.  Additional history: none.        Review of Systems              Additional review of systems information: Unable to obtain due to: Dementia (NH relates is baseline), limited hx provided.      Health Status   Allergies: No known allergies.   Medications:  (Selected)   Prescriptions  Prescribed  Sinemet CR 50 mg-200 mg oral tablet, extended release: 2 tab(s), Oral, QID, # 90 tab(s), 3 Refill(s), Pharmacy: [facility]  Zoloft 100 mg oral tablet: 100 mg = 1 tab(s), Oral, Daily, # 30 tab(s), 5 Refill(s), Pharmacy: [facility]  albuterol CFC free 90 mcg/inh inhalation aerosol with adapter: 2 puff(s), INH, q4hr, PRN PRN for wheezing, # 1 EA, 0 Refill(s)  Documented Medications  Documented  Bacmin oral tablet: 1 tab(s), Oral, Daily, # 90 tab(s), 0 Refill(s)  JANUVIA 100 MG TABS: 100 mg = 1 tab(s), Oral, Daily  Lantus 100 units/mL subcutaneous inj.: 5 units, Subcutaneous, Once a day (at bedtime), # 10 mL, 0 Refill(s)  Tylenol Extra Strength 500 mg oral tablet: 500 mg = 1 tab(s), Oral, q4hr, PRN PRN as needed for pain, # 24 tab(s), 0 Refill(s)  aspirin 81 mg oral tablet: 81 mg = 1 tab(s), Oral, Daily, # 30 tab(s), 0 Refill(s)  carvedilol 6.25 mg oral tablet: 6.25 mg = 1 tab(s), Oral, BID, # 60 tab(s), 0 Refill(s)  glimepiride 4 mg oral tablet: 4 mg = 1 tab(s), Oral, BID, # 90 tab(s), 0 Refill(s)  isosorbide DInitrate 20 mg oral tablet: 20 mg = 1 tab(s), Oral, BID, # 120 tab(s), 0 Refill(s)  lisinopril 10 mg oral tablet: 10 mg = 1 tab(s), Oral, Daily, # 90 tab(s), 0 Refill(s)  metformin 500 mg oral tablet: 500 mg = 1 tab(s), Oral, BID, # 180 tab(s), 0 Refill(s)  pravastatin 40 mg oral tablet: 40 mg = 1 tab(s), Oral, Daily, # 90 tab(s), 0 Refill(s)  tamsulosin 0.4 mg oral capsule: 0.4 mg = 1 cap(s), Oral, Daily, # 90 cap(s), 0 Refill(s).      Past Medical/ Family/ Social History   Medical history:    Resolved  Prostate cancer ([id number]):  Resolved.  Parkinson disease ([id number]):  Resolved., Reviewed as documented in chart.    Surgical history:    Back fusion ([id number]) in 1988 at 55 Years.  Cataract surgery ([id number]).  Heart valve repair ([id number])., Reviewed as documented in chart.   Family history:    Heart failure.  Mother  Lung cancer.  Father  Brother  Sister  .   Social history:    Social & Psychosocial Habits    Alcohol  09/17/2015 Risk Assessment: Denies Alcohol Use    09/25/2015  Use: Past    Type: Beer    Has alcohol use interfered with work or home life? No    Do you ever drink more than intended? No    Has anyone been hurt or at risk by your drinking? No    Ready to change: No    Concerns about alcohol use in household: No    Substance Abuse  09/25/2015  Use: Never    Tobacco  09/17/2015 Risk Assessment: Denies Tobacco Use    09/25/2015  Use: Former smoker    Comment: Quit 40 yrs ago - 09/25/2015 10:41 - Ember Maguire LPN  , Reviewed as documented in chart, Family/social situation: Nursing home resident.   Problem list:    Active Problems (6)  CAD - Coronary artery disease   Diabetic acidosis   Hypertension   Obesity   Stroke   Tremors of nervous system   .      Physical Examination               Vital Signs   Vital Signs   3/14/2018 0:30 CDT       Peripheral Pulse Rate     82 bpm                             Respiratory Rate          18 br/min                             SpO2                      97 %                             Oxygen Therapy            Room air                             Systolic Blood Pressure   156 mmHg  HI                             Diastolic Blood Pressure  83 mmHg                             Mean Arterial Pressure, Cuff              107 mmHg    3/14/2018 0:23 CDT       Temperature Oral          36.6 DegC                             Temperature Oral (calculated)             97.88 DegF                             Peripheral Pulse Rate     74 bpm                             Respiratory Rate          16 br/min                             SpO2                      100 %                              Oxygen Therapy            Room air                             Systolic Blood Pressure   149 mmHg  HI                             Diastolic Blood Pressure  80 mmHg  .   Measurements   3/14/2018 0:23 CDT       Weight Dosing             76 kg                             Weight Measured and Calculated in Lbs     167.55 lb                             Weight Estimated          76 kg                             Height/Length Dosing      157 cm                             Height/Length Estimated   157 cm                             Body Mass Index Estimated 30.83 kg/m2  .   Basic Oxygen Information   3/14/2018 0:30 CDT       SpO2                      97 %                             Oxygen Therapy            Room air    3/14/2018 0:23 CDT       SpO2                      100 %                             Oxygen Therapy            Room air  .   General:  Alert, no acute distress.    Gratz coma scale:  Eye response: 4 /4, verbal response: 4 /5, motor response: 6 /6, Total score: Total score: 15.    Neurological:  Cognitive function: To person, to place, not to situation, Speech: Slurred (mild), normal per NH.    Skin:  Warm, dry, abrasions to left cheek/face.    Head:  Normocephalic.   Neck:  Supple, trachea midline.    Eye:  Pupils are equal, round and reactive to light, extraocular movements are intact, Periorbital area: Left, ecchymotic, Conjunctiva: Left, with subconjunctival hemorrhage.    Ears, nose, mouth and throat:  Oral mucosa moist.   Cardiovascular:  Regular rate and rhythm, No murmur, Normal peripheral perfusion, No edema.    Respiratory:  Lungs are clear to auscultation, respirations are non-labored.    Gastrointestinal:  Soft, Nontender, Non distended, Normal bowel sounds.    Back:  no cervical, thoracic or lumbar tenderness, no stepoff deformity.   Psychiatric:  Cooperative.      Medical Decision Making   Documents reviewed:  Emergency department nurses' notes.   Orders  Launch Order Profile (Selected)    Inpatient Orders  Ordered  Discharge Planning Ongoing Assessment: 03/17/18 9:00:00 CDT, q3day  Fall Risk Protocol: 03/14/18 0:49:50 CDT, Constant Order  Ordered (Exam Completed)  CT Cervical Spine W/O Contrast: Stat, 03/14/18 0:51:00 CDT, Trauma, None, Stretcher, Patient Has IV?, Rad Type, Schedule this test, [facility], 03/14/18 0:51:00 CDT  CT Head W/O Contrast: Stat, 03/14/18 0:51:00 CDT, Trauma, None, Stretcher, Patient Has IV?, Rad Type, Schedule this test, [facility], 03/14/18 0:51:00 CDT  CT Maxillofacial W/O Contrast: Stat, 03/14/18 0:51:00 CDT, Trauma, None, Stretcher, Patient Has IV?, Rad Type, Schedule this test, [facility], 03/14/18 0:51:00 CDT.   Results review:   Head Computed Tomography:  Time reported 3/14/2018 02:09:00, interpretation by Radiologist,   Impression  No acute bleed, fracture or infarct     Parenchymal atrophy.     Periventricular changes which are nonspecific however most suggestive of small vessel disease.     old lacunar infarcts in the right basal ganglia and cerebellum.     Electronically signed on Mar 14, 2018 2:09:19 AM CDT by:  Cathy Baez MD  Diplomate, American Board of Radiology.    Radiology results:  Reported at  3/14/2018 02:10:00, Computed tomography, cervical spine, without contrast, Rad Results (ST)  < 12 hrs   Impression  No acute fracture or dislocation cervical spine.   Degenerative changes. .     Vascular calcifications are present in the carotids.     Electronically signed on Mar 14, 2018 2:10:59 AM CDT by:  Cathy Baez MD  Diplomate, American Board of Radiology     .    Radiology results:  Reported at  3/14/2018 02:06:00, Computed tomography, maxillofacial, without contrast, Rad Results (ST)  < 12 hrs     Impression  No evidence of an acute fracture or dislocation.   Left facial swelling.     Electronically signed on Mar 14, 2018 2:06:51 AM CDT by:  Cathy Baez MD  Diplomate, American Board of Radiology   .       Reexamination/  Reevaluation   Notes: Patient not sure how he fell tonight; however, as it is nearly 1:00 in the morning, he denies any chest pain, shortness of breath or feeling like he was going to faint, suspect a simple fall rather than syncope.  Has evidence of facial swelling and abrasions on exam.  Head CT, CT cervical spine CT maxillofacial with facial swelling but no associated fractures, clinically significant intracranial injury or other acute injury identified.  Wounds cleansed by bedside RN.  She apply topical bacitracin or other similar petroleum based lubricant while wounds are healing.  No suture repair indicated at this time. ED return precautions discussed with patient at the bedside and provided in the printed discharge instructions. All questions answered. Patient should follow up with primary care in 1-2 days for reevaluation. .      Impression and Plan   Diagnosis   Closed head injury (JGD08-OP S09.90XA)   Periorbital contusion (XYL50-JR S05.10XA)   Facial abrasion (LSH74-GB S00.81XA)   Subconjunctival hemorrhage (ZFS15-UZ H11.30)   Plan   Condition: Stable.    Disposition: Discharged: Time  3/14/2018 03:13:00, to a nursing home.    Patient was given the following educational materials: Head Injury, Adult, Eye Contusion, Subconjunctival Hemorrhage, Abrasion.    Follow up with: Follow up with primary care provider Within 1 to 2 days Call for followup appointment; Report to Emergency Department if symptoms return or worsen.    Counseled: Patient, Regarding diagnosis, Regarding diagnostic results, Regarding treatment plan.    Notes: Marielle LE, acted solely as a scribe for and in the presence of Dr. Blake who performed the service., IJaneth, have independently performed the history, physical, medical decision making and procedures as documented above and agree with the scribe's documentation. .

## 2023-05-15 NOTE — ED PROVIDER NOTES
"   Patient:   Max Omer             MRN: [id number]            FIN: [id number]               Age:   83 years     Sex:  Male     :  10/21/1933   Associated Diagnoses:   Shortness of breath; Cough   Author:   Eliazar BEDOLLA, Yeison FLORES      Basic Information   Time seen: Date & time 2017 12:31:00.   History source: Patient, family.   Arrival mode: Private vehicle, wheelchair.   History limitation: None.   Additional information: Patient's physician(s): Isabel BEDOLLA , Kathleen Baker MD, Jamal VANCE      History of Present Illness   The patient presents with Patient C/O SOB starting this morning. states that his PCP told him last week that he had a "spot" on his lung and started him on an antibiotic. denies any chest pain (marilyn, AMPARO).  and I, Dr. Perea, assumed care for this patient at 1324.  84 y/o white male with history of Parkinson's Disease presents to ED with family at bedside c/o shortness of breath. Patient states his SOB started this AM, however his daughter states patient has had intermittent shortness of breath x 2 weeks worse with exertion along with chest pain. Per daughter, she took patient to his PCP on  where he had a x-ray done. Daughter states they were told patient had a "spot" on his right lung, and was given antibiotics. Patient is not on O2 at home..  The onset was 2  weeks ago.  The course/duration of symptoms is fluctuating in intensity.  Degree at onset moderate.  Degree at present moderate.  The Exacerbating factors is exertion.  The Relieving factors is none.  Risk factors consist of none.  Prior episodes: none.  Therapy today: none.  Associated symptoms: chest pain.        Review of Systems   Constitutional symptoms:  Negative except as documented in HPI.   Skin symptoms:  Negative except as documented in HPI.   Eye symptoms:  Negative except as documented in HPI.   ENMT symptoms:  Negative except as documented in HPI.   Respiratory symptoms:  Shortness of breath. " Has The Growth Been Previously Biopsied?: has been previously biopsied   Cardiovascular symptoms:  Negative except as documented in HPI.   Gastrointestinal symptoms:  Negative except as documented in HPI.   Genitourinary symptoms:  Negative except as documented in HPI.   Musculoskeletal symptoms:  Negative except as documented in HPI.   Neurologic symptoms:  Negative except as documented in HPI.   Psychiatric symptoms:  Negative except as documented in HPI.   Endocrine symptoms:  Negative except as documented in HPI.   Hematologic/Lymphatic symptoms:  Negative except as documented in HPI.   Allergy/immunologic symptoms:  Negative except as documented in HPI.             Additional review of systems information: All other systems reviewed and otherwise negative.      Health Status   Allergies:    Allergic Reactions (Selected)  No Known Medication Allergies.   Medications:  (Selected)   Prescriptions  Prescribed  Sinemet CR 50 mg-200 mg oral tablet, extended release: 1 tab(s), Oral, TID, # 90 tab(s), 3 Refill(s), Pharmacy: [facility]  Zoloft 100 mg oral tablet: 100 mg = 1 tab(s), Oral, Daily, # 30 tab(s), 5 Refill(s), Pharmacy: [facility]  Documented Medications  Documented  Bacmin oral tablet: 1 tab(s), Oral, Daily, # 90 tab(s), 0 Refill(s)  Lasix 40 mg oral tablet: 40 mg = 1 tab(s), Oral, BID, # 30 tab(s), 0 Refill(s)  Lasix 40 mg oral tablet: 40 mg = 1 tab(s), Oral, BID, # 90 tab(s), 0 Refill(s)  aspirin 81 mg oral tablet: 81 mg = 1 tab(s), Oral, Daily, # 30 tab(s), 0 Refill(s)  aspirin 81 mg oral tablet: 81 mg = 1 tab(s), Oral, Daily, 0 Refill(s)  carvedilol 6.25 mg oral tablet: 6.25 mg = 1 tab(s), Oral, BID, # 60 tab(s), 0 Refill(s)  carvedilol 6.25 mg oral tablet: 6.25 mg = 1 tab(s), Oral, BID, # 60 tab(s), 0 Refill(s)  glimepiride 4 mg oral tablet: 4 mg = 1 tab(s), Oral, BID, # 30 tab(s), 0 Refill(s)  glimepiride 4 mg oral tablet: 4 mg = 1 tab(s), Oral, BID, # 90 tab(s), 0 Refill(s)  isosorbide DInitrate 20 mg oral tablet: 20 mg = 1 tab(s), Oral, BID, # 120 tab(s), 0  Body Location Override (Optional): Right knee Refill(s)  isosorbide DInitrate 20 mg oral tablet: 20 mg = 1 tab(s), Oral, q6hr, # 120 tab(s), 0 Refill(s)  lisinopril 10 mg oral tablet: 10 mg = 1 tab(s), Oral, Daily, # 30 tab(s), 0 Refill(s)  lisinopril 10 mg oral tablet: 10 mg = 1 tab(s), Oral, Daily, # 90 tab(s), 0 Refill(s)  metformin 500 mg oral tablet: 500 mg = 1 tab(s), Oral, BID, # 180 tab(s), 0 Refill(s)  metformin 500 mg oral tablet: 500 mg = 1 tab(s), Oral, BID, # 60 tab(s), 0 Refill(s)  potassium chloride 20 mEq oral TABLET extended release: 20 mEq = 1 tab(s), Oral, Daily, # 30 tab(s), 0 Refill(s)  potassium gluconate: 0 Refill(s)  pravastatin 40 mg oral tablet: 40 mg = 1 tab(s), Oral, Daily, # 30 tab(s), 0 Refill(s)  pravastatin 40 mg oral tablet: 40 mg = 1 tab(s), Oral, Daily, # 90 tab(s), 0 Refill(s)  tamsulosin 0.4 mg oral capsule: 0.4 mg = 1 cap(s), Oral, Daily, # 90 cap(s), 0 Refill(s)  tamsulosin 0.4 mg oral capsule: 0.4 mg = 1 cap(s), Oral, Daily, 0 Refill(s).      Past Medical/ Family/ Social History   Medical history:    Resolved  Prostate cancer ([id number]):  Resolved.  Parkinson disease ([id number]):  Resolved., Reviewed as documented in chart.   Surgical history:    Back fusion ([id number]) in 1988 at 55 Years.  Cataract surgery ([id number]).  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   Family history:    Heart failure.  Mother  Lung cancer.  Father  Brother  Sister  .   Social history: Alcohol use: Denies, Tobacco use: Denies, Drug use: Denies.      Physical Examination               Vital Signs   Vital Signs   8/1/2017 14:00 CDT       Peripheral Pulse Rate     73 bpm                             Heart Rate Monitored      74 bpm                             Respiratory Rate          19 br/min                             SpO2                      95 %                             Oxygen Therapy            Nasal cannula                             Oxygen Flow Rate           2 L/min                             Systolic Blood Pressure   146 mmHg  HI                             Diastolic Blood Pressure  76 mmHg                             Mean Arterial Pressure, Cuff              99 mmHg    8/1/2017 13:25 CDT       Peripheral Pulse Rate     76 bpm                             Heart Rate Monitored      78 bpm                             Respiratory Rate          17 br/min                             SpO2                      97 %                             Oxygen Therapy            Nasal cannula                             Oxygen Flow Rate          2 L/min                             Systolic Blood Pressure   118 mmHg                             Diastolic Blood Pressure  81 mmHg                             Mean Arterial Pressure, Cuff              93 mmHg    8/1/2017 13:00 CDT       Peripheral Pulse Rate     76 bpm                             Heart Rate Monitored      76 bpm                             Respiratory Rate          18 br/min                             SpO2                      95 %                             Oxygen Therapy            Nasal cannula                             Oxygen Flow Rate          2 L/min                             Systolic Blood Pressure   160 mmHg  HI                             Diastolic Blood Pressure  86 mmHg                             Mean Arterial Pressure, Cuff              111 mmHg    8/1/2017 12:30 CDT       Temperature Oral          36.6 DegC                             Peripheral Pulse Rate     80 bpm                             Respiratory Rate          24 br/min                             SpO2                      96 %                             Oxygen Therapy            Room air                             Systolic Blood Pressure   154 mmHg  HI                             Diastolic Blood Pressure  80 mmHg  .   Measurements   8/1/2017 12:30 CDT       Weight Dosing             76 kg                             Weight Measured and  Calculated in Lbs     167.55 lb                             Weight Estimated          76 kg                             Height/Length Dosing      157 cm                             Height/Length Estimated   157 cm                             Body Mass Index Estimated 30.83 kg/m2  .   Basic Oxygen Information   8/1/2017 14:00 CDT       SpO2                      95 %                             Oxygen Flow Rate          2 L/min                             Oxygen Therapy            Nasal cannula    8/1/2017 13:25 CDT       SpO2                      97 %                             Oxygen Flow Rate          2 L/min                             Oxygen Therapy            Nasal cannula    8/1/2017 13:00 CDT       SpO2                      95 %                             Oxygen Flow Rate          2 L/min                             Oxygen Therapy            Nasal cannula    8/1/2017 12:30 CDT       SpO2                      96 %                             Oxygen Therapy            Room air  .   General:  Alert, no acute distress.    Skin:  Warm, dry, pink.    Eye:  Pupils are equal, round and reactive to light, extraocular movements are intact, normal conjunctiva.    Ears, nose, mouth and throat:  Tympanic membranes clear, oral mucosa moist, no pharyngeal erythema or exudate.    Neck:  Supple, trachea midline, no JVD, no carotid bruit.    Cardiovascular:  Regular rate and rhythm, Normal peripheral perfusion, No edema, Systolic murmur: late systolic click loudness at left sternal border consistent with artificial valve.    Respiratory:  Lungs are clear to auscultation, respirations are non-labored, breath sounds are equal.    Gastrointestinal:  Soft, Nontender, Non distended, Normal bowel sounds.    Musculoskeletal:  Normal ROM, normal strength, no tenderness.    Neurological:  Alert and oriented to person, place, time, and situation, No focal neurological deficit observed, CN II-XII intact, normal sensory observed, normal  coordination observed, Motor function: Resting tremor (moderate), Speech: stuttering.    Psychiatric:  Cooperative, appropriate mood & affect, normal judgment.       Medical Decision Making   Documents reviewed:  Emergency department nurses' notes.   Orders  Laboratory    CBC w/ Auto Diff, Rosita CHRISTENSEN, Jocelyn RODRIGUEZ, 08/01/17, 12:31, Completed    POC CBG, ER, Physician, 08/01/17, 12:40, Completed    Automated Diff, Rosita CHRISTENSNE, Jocelyn RODRIGUEZ, 08/01/17, 13:30, Completed    POC BNP iSTAT, ER, Physician, 08/01/17, 13:40, Completed    POC Istat ER Troponin, ER, Physician, 08/01/17, 13:40, Completed    CMP, Rosita CHRISTENSEN, Jocelyn RODRIGUEZ, 08/01/17, 12:31, Ordered    POC iSTAT ER Troponin request, Rosita CHRISTENSEN, Jocelyn RODRIGUEZ, 08/01/17, 12:31, Ordered    POC BNP iSTAT request, Rosita CHRISTENSEN, Jocelyn RODRIGUEZ, 08/01/17, 12:34, Ordered    Creatine Kinase, Yeison Perea MD, 08/01/17, 13:27, Ordered    Creatine Kinase MB, Yeison Perea MD, 08/01/17, 13:27, Ordered    Magnesium Level, Yeison Perea MD, 08/01/17, 13:27, Ordered  Xray    XR Chest 2 Views, Rosita CHRISTENSEN, Jocelyn RODRIGUEZ, 08/01/17, 12:34, Completed  CT / MRI / Ultrasound    CT Chest Lungs W Contrast, Yeison Perea MD, 08/01/17, 13:28, Ordered.   Electrocardiogram:  Time 8/1/2017 12:31:00, rate 79, normal sinus rhythm, no ectopy, EP Interp, The Axis is rightward.  , S wave in lead I   Q wave and inverted T wave in lead III.    Results review:  Lab results : Lab View   8/1/2017 13:30 CDT       POC Troponin              0.00 ng/mL    8/1/2017 13:27 CDT       POC BNP iSTAT             84 pg/mL    8/1/2017 13:20 CDT       Sodium Lvl                134 mmol/L  LOW                             Potassium Lvl             5.0 mmol/L                             Chloride                  100 mmol/L                             CO2                       28.0 mmol/L                             Calcium Lvl               8.9 mg/dL                             Magnesium Lvl             1.7 mg/dL  LOW                              Glucose Lvl               168 mg/dL  HI                             BUN                       38.0 mg/dL  HI                             Creatinine                1.88 mg/dL  HI                             eGFR-AA                   44 mL/min/1.73 m2  NA                             eGFR-ASIA                  37 mL/min/1.73 m2  NA                             Bili Total                0.4 mg/dL                             Bili Direct               0.10 mg/dL                             Bili Indirect             0.30 mg/dL                             AST                       28 unit/L                             ALT                       18 unit/L                             Alk Phos                  109 unit/L                             Total Protein             7.6 gm/dL                             Albumin Lvl               3.90 gm/dL                             Globulin                  3.70 gm/dL  HI                             A/G Ratio                 1.1  NA                             Total CK                  79 unit/L                             CK MB                     0.6 ng/mL                             D-Dimer                   1,180 ng/ml FEU  HI                             WBC                       6.0 x10(3)/mcL                             RBC                       4.09 x10(6)/mcL  LOW                             Hgb                       12.9 gm/dL  LOW                             Hct                       37.9 %  LOW                             Platelet                  149 x10(3)/mcL                             MCV                       92.7 fL                             MCH                       31.5 pg  HI                             MCHC                      34.0 gm/dL                             RDW                       12.3 %                             MPV                       9.6 fL                             Abs Neut                  4.19 x10(3)/mcL                             Neutro  Auto               70 %  NA                             Lymph Auto                18 %  NA                             Mono Auto                 8 %  NA                             Eos Auto                  2 %  NA                             Abs Eos                   0.1 x10(3)/mcL                             Basophil Auto             0 %  NA                             Abs Neutro                4.19 x10(3)/mcL                             Abs Lymph                 1.1 x10(3)/mcL                             Abs Mono                  0.5 x10(3)/mcL                             Abs Baso                  0.0 x10(3)/mcL  .   Chest X-Ray:  Time reported 8/1/2017 12:54:00,            * Final Report *    Reason For Exam  Chest Pain    Radiology Report  Clinical History  Chest Pain     Technique  2 views of the chest.     Comparison  No prior imaging available for comparison.     Findings  Lungs are clear with no visualized focal airspace opacity.  The trachea appears midline.  Postsurgical changes of median sternotomy.  There is no evidence of pneumothorax or pleural effusion.  Visualized abdomen, soft tissues, and osseous structures are  unremarkable.     Impression  No acute cardiopulmonary process.       Signature Line  Electronically Signed By: Daniel Hillman MD  Date/Time Signed: 08/01/2017 12:54  .       Impression and Plan   Diagnosis   Shortness of breath ([id number] R06.02)   Cough ([id number] R05)   Plan   Condition: Improved, Stable.    Disposition: Discharged: Time  8/1/2017 16:14:00, to home.    Prescriptions: [facility] prescriptions   Pharmacy:  albuterol CFC free 90 mcg/inh inhalation aerosol with adapter (Prescribe): 2 puff(s), INH, q4hr, PRN PRN for wheezing, # 1 EA, 0 Refill(s)  glimepiride 4 mg oral tablet (Discontinue): 8/1/2017 16:18 CDT  metformin 500 mg oral tablet (Discontinue): 8/1/2017 16:18 CDT  potassium gluconate (Discontinue): 8/1/2017 16:18 CDT  pravastatin 40 mg oral tablet (Discontinue):  8/1/2017 16:18 CDT  Lasix 40 mg oral tablet (Discontinue): 8/1/2017 16:18 CDT  tamsulosin 0.4 mg oral capsule (Discontinue): 8/1/2017 16:18 CDT  carvedilol 6.25 mg oral tablet (Discontinue): 8/1/2017 16:17 CDT  aspirin 81 mg oral tablet (Discontinue): 8/1/2017 16:17 CDT  isosorbide DInitrate 20 mg oral tablet (Discontinue): 8/1/2017 16:18 CDT  lisinopril 10 mg oral tablet (Discontinue): 8/1/2017 16:18 CDT.    Patient was given the following educational materials: Shortness of Breath.    Follow up with: Follow up with primary care provider Within 3 to 5 days Call for followup appointment; Report to ED if symptoms return / worsen Within 1 to 2 days, only if needed.    Counseled: Patient, Regarding diagnosis, Regarding diagnostic results, Regarding treatment plan, Patient indicated understanding of instructions.    Orders: [facility] Orders   Admit/Transfer/Discharge:  Discharge (Order): Home.    Notes: IAnderson, acted solely as a scribe for and in the presence of Dr. Perea who performed the service.   Carlitos LE, acted solely as a scribe for and in the presence of Dr. Perea who performed the service., IDr. Perea, personally performed the services described in this documentation. This note accurately reflects work and decisions made by me..